# Patient Record
Sex: FEMALE | Race: WHITE | NOT HISPANIC OR LATINO | Employment: STUDENT | URBAN - METROPOLITAN AREA
[De-identification: names, ages, dates, MRNs, and addresses within clinical notes are randomized per-mention and may not be internally consistent; named-entity substitution may affect disease eponyms.]

---

## 2017-03-02 ENCOUNTER — DOCUMENTATION ONLY (OUTPATIENT)
Dept: ADMINISTRATIVE | Facility: HOSPITAL | Age: 27
End: 2017-03-02

## 2017-03-02 NOTE — PROGRESS NOTES
PRE-VISIT CHART REVIEW    Appointment Scheduled on 3/3/17    Department stratifications & guidelines reviewed:yes    Target Chronic Diagnosis: None    Chronic Diagnosis Intervention Due: no    Goals Updated:n/a    Health Maintenance Due   Topic Date Due    Lipid Panel  1990    Pap Smear  04/10/2016    Influenza Vaccine  08/01/2016       Advanced Directives:   65 years of age or older?  No  Directive on file?  N\A                                      Pre-visit patient communication:  In Person    Studies or screenings scheduled pre-visit: no

## 2017-03-03 ENCOUNTER — OFFICE VISIT (OUTPATIENT)
Dept: FAMILY MEDICINE | Facility: CLINIC | Age: 27
End: 2017-03-03
Payer: COMMERCIAL

## 2017-03-03 ENCOUNTER — PATIENT MESSAGE (OUTPATIENT)
Dept: FAMILY MEDICINE | Facility: CLINIC | Age: 27
End: 2017-03-03

## 2017-03-03 VITALS
SYSTOLIC BLOOD PRESSURE: 110 MMHG | WEIGHT: 134.06 LBS | OXYGEN SATURATION: 99 % | DIASTOLIC BLOOD PRESSURE: 60 MMHG | HEART RATE: 81 BPM | HEIGHT: 63 IN | RESPIRATION RATE: 14 BRPM | TEMPERATURE: 98 F | BODY MASS INDEX: 23.75 KG/M2

## 2017-03-03 DIAGNOSIS — M25.532 LEFT WRIST PAIN: Primary | ICD-10-CM

## 2017-03-03 DIAGNOSIS — M77.8 TENDONITIS OF WRIST, LEFT: ICD-10-CM

## 2017-03-03 PROCEDURE — 1160F RVW MEDS BY RX/DR IN RCRD: CPT | Mod: S$GLB,,, | Performed by: NURSE PRACTITIONER

## 2017-03-03 PROCEDURE — 99214 OFFICE O/P EST MOD 30 MIN: CPT | Mod: S$GLB,,, | Performed by: NURSE PRACTITIONER

## 2017-03-03 NOTE — MR AVS SNAPSHOT
"    [facility]  [address] [address]  [place] [address]  Phone: [phone]  Fax: [phone]                  Linda Sherwood   3/3/2017 2:00 PM   Office Visit    Description:  Female : 1990   Provider:  Yenni Coleman NP   Department:  [facility]           Reason for Visit     Wrist Pain           Diagnoses this Visit        Comments    Left wrist pain    -  Primary            To Do List           Future Appointments        Provider Department Dept Phone    3/13/2017 12:15 PM [id number] [phone]    3/13/2017 1:00 PM Davy Yu MD [facility] [phone]      Goals (5 Years of Data)     None      [facility]     [facility] Care Line -  Assistance  Registered nurses in the [facility] provide clinical advisement, health education, appointment booking, and other advisory services.  Call for this free service at [phone].             Medications           Message regarding Medications     Verify the changes and/or additions to your medication regime listed below are the same as discussed with your clinician today.  If any of these changes or additions are incorrect, please notify your healthcare provider.             Verify that the below list of medications is an accurate representation of the medications you are currently taking.  If none reported, the list may be blank. If incorrect, please contact your healthcare provider. Carry this list with you in case of emergency.                Clinical Reference Information           Your Vitals Were     BP Pulse Temp Resp Height Weight    110/60 (BP Location: Left arm, Patient Position: Sitting, BP Method: Manual) 81 98.3 °F (36.8 °C) (Oral) 14 5' 3" (1.6 m) 60.8 kg (134 lb 0.6 oz)    SpO2 BMI             99% 23.74 kg/m2         Blood Pressure          Most Recent Value    BP  110/60      Allergies as of " 3/3/2017     No Known Allergies      Immunizations Administered on Date of Encounter - 3/3/2017     None      Orders Placed During Today's Visit      Normal Orders This Visit    Ambulatory referral to Orthopedics     Future Labs/Procedures Expected by Expires    X-Ray Wrist Complete Left  3/3/2017 3/3/2018      Language Assistance Services     ATTENTION: Language assistance services are available, free of charge. Please call [phone].      ATENCIÓN: Si habla jaradañol, tiene a cherry disposición servicios gratuitos de asistencia lingüística. Llame al [phone].     CHÚ Ý: N?u b?n nói Ti?ng Vi?t, có các d?ch v? h? tr? ngôn ng? mi?n phí dành cho b?n. G?i s? [phone].         [facility] complies with applicable Federal civil rights laws and does not discriminate on the basis of race, color, national origin, age, disability, or sex.

## 2017-03-03 NOTE — PROGRESS NOTES
"Subjective:       Patient ID: Linda Sherwood is a 26 y.o. female.    Chief Complaint: Wrist Pain (left wrist, symptoms for 1-2 weeks)    HPI left wrist pain for several weeks. Any type of pressure hurts.  so hurts to use it at work. Pain from the wrist into the thumb area. No numbness. Weak at times. Cannot put weight on it. Motrin does not help. No specific injury. See ROS.    The following portion of the patients history was reviewed and updated as appropriate: allergies, current medications, past medical and surgical history. Past social history and problem list reviewed. Family PMH and Past social history reviewed. Tobacco, Illicit drug use reviewed.     Review of Systems  Constitutional: No fatigue or fever    Skin: no rashes or lesions  Respiratory:   No SOB, Wheezing, cough  Cardiovascular:   No CP, Palpitations  Musculoskeletal:   left wrist joint pain  No change in gait or coordination. .See HPI  Neurological:   No dizziness. No headaches    Objective:     /60 (BP Location: Left arm, Patient Position: Sitting, BP Method: Manual)  Pulse 81  Temp 98.3 °F (36.8 °C) (Oral)   Resp 14  Ht 5' 3" (1.6 m)  Wt 60.8 kg (134 lb 0.6 oz)  SpO2 99%  BMI 23.74 kg/m2     Physical Exam     Constitutional: oriented to person, place, and time. well-developed and well-nourished.   Cardiovascular: Normal rate, regular rhythm and normal heart sounds.    Pulmonary/Chest: Effort normal and breath sounds normal. No respiratory distress. No wheezes.   Musculoskeletal: Normal range of motion.  equal bilaterally. Tenderness with palpation to left wrist area. Pain with flexion and extension from the wrist.   Neurological: oriented to person, place, and time.   Skin: Skin is warm and dry. No rashes or lesions    Assessment:       1. Left wrist pain    2. Tendonitis of wrist, left        Plan:         Linda was seen today for wrist pain.    Diagnoses and all orders for this visit:    Left wrist pain: refer " to Orthopedics for evaluation and treatment options. Get Xray before appointment with Ortho.   -     X-Ray Wrist Complete Left; Future  -     Ambulatory referral to Orthopedics    Tendonitis of wrist, left: 600mg motrin BID.   Gave written script for cockup splint to wear when sleeping and for heavy lifting, repetitive motion activities.       Healthy diet, exercise  Adequate rest  Adequate hydration  Avoid allergens  Avoid excessive caffeine

## 2017-03-08 ENCOUNTER — HOSPITAL ENCOUNTER (OUTPATIENT)
Dept: RADIOLOGY | Facility: HOSPITAL | Age: 27
Discharge: HOME | End: 2017-03-08
Attending: NURSE PRACTITIONER
Payer: COMMERCIAL

## 2017-03-08 ENCOUNTER — PATIENT MESSAGE (OUTPATIENT)
Dept: FAMILY MEDICINE | Facility: CLINIC | Age: 27
End: 2017-03-08

## 2017-03-08 DIAGNOSIS — M25.532 LEFT WRIST PAIN: ICD-10-CM

## 2017-03-08 PROCEDURE — 73110 X-RAY EXAM OF WRIST: CPT | Mod: TC,PO,LT

## 2017-03-08 PROCEDURE — 73110 X-RAY EXAM OF WRIST: CPT | Mod: 26,LT,, | Performed by: RADIOLOGY

## 2017-03-09 ENCOUNTER — OFFICE VISIT (OUTPATIENT)
Dept: ORTHOPEDICS | Facility: CLINIC | Age: 27
End: 2017-03-09
Payer: COMMERCIAL

## 2017-03-09 VITALS — HEIGHT: 63 IN | WEIGHT: 134 LBS | BODY MASS INDEX: 23.74 KG/M2

## 2017-03-09 DIAGNOSIS — M77.8 LEFT WRIST TENDINITIS: Primary | ICD-10-CM

## 2017-03-09 PROCEDURE — 99999 PR PBB SHADOW E&M-EST. PATIENT-LVL II: CPT | Mod: PBBFAC,,, | Performed by: ORTHOPAEDIC SURGERY

## 2017-03-09 PROCEDURE — 99243 OFF/OP CNSLTJ NEW/EST LOW 30: CPT | Mod: S$GLB,,, | Performed by: ORTHOPAEDIC SURGERY

## 2017-03-09 NOTE — LETTER
March 9, 2017      Yenni Coleman, TREVIN  [address] [address] [address]           [facility] [address] [facility] [address] [place] [address]  Phone: [phone]          Patient: Linda Sherwood   MR Number: [id number]   YOB: 1990   Date of Visit: 3/9/2017       Dear Yenni Coleman:    Thank you for referring Linda Sherwood to me for evaluation. Attached you will find relevant portions of my assessment and plan of care.    If you have questions, please do not hesitate to call me. I look forward to following Linda Sherwood along with you.    Sincerely,    Davy Yu MD    Enclosure  CC:  No Recipients    If you would like to receive this communication electronically, please contact [email or web address] or [phone] to request more information on [employer] Link access.    For providers and/or their staff who would like to refer a patient to [facility], please contact us through our one-stop-shop provider referral line, Jimmy Malin, at [phone].    If you feel you have received this communication in error or would no longer like to receive these types of communications, please e-mail [email or web address]

## 2017-03-09 NOTE — PROGRESS NOTES
DATE: 3/9/2017  PATIENT: Linda Sherwood  REFERRING MD: Yenni Coleman NP  CHIEF COMPLAINT:   Chief Complaint   Patient presents with    Left Wrist - Pain       HISTORY:  Linda Sherwood is a 26 y.o. right hand dominant female  who is referred by Yenni Coleman NP for initial evaluation of left wrist pain.  She is employed as a  and notes over the last 2 weeks she developed discomfort on the dorsum of her wrist.  She denies any traumatic event.  She does a lot of repetitive activities and she states her biggest problem is blow drying tear.  She did get a wrist brace which does help.  She denies any numbness, tingling or weakness to the upper extremity.  She notes pain with activity.  She denies any previous injuries to her left wrist.  Pain is reported at 3/10 today.    PAST MEDICAL/SURGICAL HISTORY:  Past Medical History:   Diagnosis Date    Anxiety     Depression      Past Surgical History:   Procedure Laterality Date    INTESTINAL MALROTATION REPAIR         Current Medications: No current outpatient prescriptions on file.    Social History:   Social History     Social History    Marital status:      Spouse name: N/A    Number of children: N/A    Years of education: N/A     Occupational History     [employer]     Social History Main Topics    Smoking status: Former Smoker    Smokeless tobacco: Never Used    Alcohol use No    Drug use: No    Sexual activity: Not on file     Other Topics Concern    Not on file     Social History Narrative       ROS:  Constitution: Negative for chills, fever, and sweats. Negative for unexplained weight loss.  HENT: Negative for headaches and blurry vision.   Cardiovascular: Negative for chest pain, irregular heartbeat, leg swelling and palpitations.   Respiratory: Negative for cough and shortness of breath.   Gastrointestinal: Negative for abdominal pain, heartburn, nausea and vomiting.   Genitourinary:  "Negative for bladder incontinence and dysuria.   Musculoskeletal: Negative for systemic arthritis, joint swelling, muscle weakness and myalgias.   Neurological: Negative for numbness.   Psychiatric/Behavioral: Negative for depression.   Endocrine: Negative for polyuria.   Hematologic/Lymphatic: Negative for bleeding disorders.  Skin: Negative for poor wound healing.       PHYSICAL EXAM:  Ht 5' 3" (1.6 m)  Wt 60.8 kg (134 lb)  BMI 23.74 kg/m2  Healthy-appearing female no acute distress.  Examination left wrist reveals a brace in place this is been removed.  No ecchymosis, swelling or effusion.  There is tenderness over the base of the second metacarpal dorsally.  No tenderness in the snuffbox or the first dorsal extensor compartment.  Range of motion wrist is full to dorsiflexion, volar flexion, radial and ulnar deviation.   strength within normal limits.  Sensation intact to the radial, ulnar, median nerve distribution of the hand.      IMAGING:   X-rays a left wrist are performed and reviewed.  No acute fractures, dislocations, bony or soft tissue abnormalities are noted.     ASSESSMENT:   Extensor tendinitis left wrist    PLAN:  The nature of the diagnosis, using models and diagrams when appropriate, was explained to the patient in detail.. Al I have explained E the treatment is continuing with the wrist splint, anti-inflammatory medication but most importantly avoidance of the offending activities.  I recommended that he refrain from blow drying care for the next 1-2 weeks that her symptoms would improve dramatically.  We discussed possibility of cortisone injection or occupational therapy should she continue to remain symptomatic.  She'll monitor her symptoms over the next few weeks.  Should they persist, she'll return for reexamination and further treatment recommendations.      This note was dictated using voice recognition software and may contain grammatical errors  Answers for HPI/ROS submitted by the " patient on 3/7/2017   Hand injury  unexpected weight change: No  appetite change : No  sleep disturbance: No  IMMUNOCOMPROMISED: No  nervous/ anxious: No  dysphoric mood: No  rash: No  visual disturbance: No  eye redness: No  eye pain: No  ear pain: No  tinnitus: No  hearing loss: No  sinus pressure : No  nosebleeds: No  enviro allergies: Yes  food allergies: No  cough: No  shortness of breath: No  sweating: No  dysuria: No  frequency: No  difficulty urinating: No  hematuria: No  painful intercourse: No  chest pain: No  palpitations: No  nausea: No  vomiting: No  diarrhea: No  blood in stool: No  constipation: No  headaches: No  dizziness: No  numbness: No  seizures: No  joint swelling: No  myalgia: No  weakness: No  back pain: No  Pain Chronicity: chronic  History of trauma: No  Onset: 1 to 4 weeks ago  Frequency: constantly  Progression since onset: gradually worsening  Injury mechanism: pushing  injury location: at home  pain- numeric: 4/10  pain location: left wrist  pain quality: aching, sharp, tightness, tight  Radiating Pain: No  Aggravating factors: bearing weight, extension, rotation  fever: No  inability to bear weight: Yes  itching: No  joint locking: No  limited range of motion: Yes  stiffness: No  tingling: No  Treatments tried: acupuncture, brace/corset, NSAIDs, rest  physical therapy: not tried  Improvement on treatment: no relief

## 2017-10-11 ENCOUNTER — PATIENT MESSAGE (OUTPATIENT)
Dept: FAMILY MEDICINE | Facility: CLINIC | Age: 27
End: 2017-10-11

## 2017-10-12 ENCOUNTER — TELEPHONE (OUTPATIENT)
Dept: ADMINISTRATIVE | Facility: HOSPITAL | Age: 27
End: 2017-10-12

## 2017-10-12 NOTE — LETTER
October 16, 2017    Dr. Marta Mcgee             [facility]  [address]  [facility] [address]  Phone: [phone] October 16, 2017     Patient: Linda Sherwood    YOB: 1990   Date of Visit: 10/12/2017       To Whom It May Concern:    We are seeing Linda Sherwood in the clinic at [facility].  Yenni Coelman NP is their PCP.  She has an outstanding lab/procedure at the time we reviewed their chart.  To help with our Health Maintenance records will you please supply the following report(s):      []  Mammogram                                                []  Colonoscopy   [x]  Pap Smear (done 10/6/17)                       []  Outside Lab Results   []  Dexa scans                                                   []  Eye Exam   []  Foot Exam                                                     [] Other___________   []  Outside Immunizations                                               Please Fax to [facility] at [phone].    Thank you for your help. If my Care Coordinator can be of any assistance, you can call RIO Dugan at [phone].    This is the second attempt to acquire these records.    If you have any questions or concerns, please don't hesitate to call.    Sincerely,        Yenni Coleman NP

## 2017-10-12 NOTE — LETTER
October 12, 2017    Dr. Marta Mcgee             [facility]  [address]  [facility] [address]  Phone: [phone] October 12, 2017     Patient: Linda Sherwood    YOB: 1990   Date of Visit: 10/12/2017       To Whom It May Concern:                                             We are seeing Linda Sherwood in the clinic at [facility].  Yenni Coleman NP is their PCP.  She has an outstanding lab/procedure at the time we reviewed their chart.  To help with our Health Maintenance records will you please supply the following report(s):      []  Mammogram                                                []  Colonoscopy   [x]  Pap Smear (done 10/6/17)                       []  Outside Lab Results   []  Dexa scans                                                   []  Eye Exam   []  Foot Exam                                                     [] Other___________   []  Outside Immunizations                                               Please Fax to [facility] at [phone].    Thank you for your help. If my Care Coordinator can be of any assistance, you can call RIO Dugan at [phone].    If you have any questions or concerns, please don't hesitate to call.    Sincerely,        Yenni Coleman NP

## 2017-10-12 NOTE — TELEPHONE ENCOUNTER
Please update my record with the following information.  Test or Procedure: PAP smear  Date Completed: 10/6/2017  Place of Service: Dr Mcgee   Include any additional comments: results were normal    You may attach a copy of the test or result below.

## 2018-03-07 ENCOUNTER — OFFICE VISIT (OUTPATIENT)
Dept: FAMILY MEDICINE | Facility: CLINIC | Age: 28
End: 2018-03-07
Payer: COMMERCIAL

## 2018-03-07 VITALS
BODY MASS INDEX: 22.5 KG/M2 | SYSTOLIC BLOOD PRESSURE: 112 MMHG | DIASTOLIC BLOOD PRESSURE: 60 MMHG | RESPIRATION RATE: 16 BRPM | TEMPERATURE: 99 F | HEIGHT: 63 IN | WEIGHT: 127 LBS | HEART RATE: 80 BPM

## 2018-03-07 DIAGNOSIS — M25.552 BILATERAL HIP PAIN: ICD-10-CM

## 2018-03-07 DIAGNOSIS — M25.551 BILATERAL HIP PAIN: ICD-10-CM

## 2018-03-07 DIAGNOSIS — Z00.00 LABORATORY EXAM ORDERED AS PART OF ROUTINE GENERAL MEDICAL EXAMINATION: ICD-10-CM

## 2018-03-07 DIAGNOSIS — Z00.00 ANNUAL PHYSICAL EXAM: Primary | ICD-10-CM

## 2018-03-07 PROCEDURE — 99395 PREV VISIT EST AGE 18-39: CPT | Mod: S$GLB,,, | Performed by: NURSE PRACTITIONER

## 2018-03-07 RX ORDER — DEXTROAMPHETAMINE SULFATE, DEXTROAMPHETAMINE SACCHARATE, AMPHETAMINE SULFATE AND AMPHETAMINE ASPARTATE 5; 5; 5; 5 MG/1; MG/1; MG/1; MG/1
1 CAPSULE, EXTENDED RELEASE ORAL DAILY
Refills: 0 | COMMUNITY
Start: 2018-03-06 | End: 2022-09-13

## 2018-03-07 RX ORDER — NORELGESTROMIN AND ETHINYL ESTRADIOL 150; 35 UG/D; UG/D
1 PATCH TRANSDERMAL WEEKLY
Refills: 12 | COMMUNITY
Start: 2018-02-15 | End: 2018-04-12

## 2018-03-07 NOTE — PROGRESS NOTES
"Subjective:       Patient ID: Linda Sherwood is a 27 y.o. female.    Chief Complaint: Annual Exam    HPI Here for annual exam. States she is doing well. She is due for routine labs. States she is current on her pap. She states that she has some bilateral hip pain that comes and goes. Has had this discomfort for over 6 months now. States at night she cannot lay on her right or left hip because they will start to hurt, achy type pain. If she walks long distances or stands on her feet all day then they will start to hurt. She would like to have this evaluated. She denies any trauma to the area. She denies any other concerns. See ROS.    The following portion of the patients history was reviewed and updated as appropriate: allergies, current medications, past medical and surgical history. Past social history and problem list reviewed. Family PMH and Past social history reviewed. Tobacco, Illicit drug use reviewed.     Review of Systems  Constitutional: No fatigue or fever    HENT: no sore throat or nasal congestion. No voice changes    Eyes: No vision changes, blurred vision  Skin: no rashes or lesions  Respiratory:   No SOB, Wheezing, cough  Cardiovascular:   No CP, Palpitations  Gastrointestinal:   No N/V/D. No abdominal pain, weight stable. Appetite good.   Genitourinary:   No dysuria, urgency or frequency. No change in bowels. No blood in stools.   Musculoskeletal:     No change in gait or coordination. See HPI  Neurological:   No dizziness. No headaches  Hematological: No abnormal bruising or bleeding    Psychiatric/Behavioral Negative for suicidal ideas.  Denies feelings of depression. No thoughts of wanting to harm self or others.     Objective:     /60 (BP Location: Right leg, Patient Position: Sitting, BP Method: Medium (Manual))   Pulse 80   Temp 98.9 °F (37.2 °C) (Oral)   Resp 16   Ht 5' 3" (1.6 m)   Wt 57.6 kg (126 lb 15.8 oz)   LMP 02/07/2018 (Approximate)   BMI 22.49 kg/m²      Physical " Exam    Constitutional: oriented to person, place, and time. well-developed and well-nourished.   HENT: normal  Head: Normocephalic.   Eyes: Conjunctivae are normal. Pupils are equal, round, and reactive to light.   Neck: Normal range of motion. Neck supple. No tracheal deviation present. No thyromegaly present.   Cardiovascular: Normal rate, regular rhythm and normal heart sounds.    Pulmonary/Chest: Effort normal and breath sounds normal. No respiratory distress. No wheezes.   Abdominal: Soft. Bowel sounds are normal. No distension. There is no tenderness.   Musculoskeletal: Normal range of motion. Normal lower extremity strength. Pulses normal.   Neurological: oriented to person, place, and time.   Skin: Skin is warm and dry. No rashes or lesions  Psychiatric: Normal mood and affect.Behavior is normal. Judgment and thought content normal.   Assessment:       1. Annual physical exam    2. Laboratory exam ordered as part of routine general medical examination    3. Bilateral hip pain        Plan:         Linda was seen today for annual exam.    Diagnoses and all orders for this visit:    Annual physical exam    Laboratory exam ordered as part of routine general medical examination  -     CBC auto differential; Future  -     Comprehensive metabolic panel; Future  -     Lipid panel; Future  -     TSH; Future    Bilateral hip pain: will get xrays and if needed refer to physical therapy.   -     X-Ray Hip 3 or 4 views Bilateral; Future    Continue current medication  Take medications only as prescribed  Healthy diet, exercise  Adequate rest  Adequate hydration  Avoid allergens  Avoid excessive caffeine

## 2018-03-09 ENCOUNTER — PATIENT MESSAGE (OUTPATIENT)
Dept: FAMILY MEDICINE | Facility: CLINIC | Age: 28
End: 2018-03-09

## 2018-03-12 ENCOUNTER — LAB VISIT (OUTPATIENT)
Dept: LAB | Facility: HOSPITAL | Age: 28
End: 2018-03-12
Attending: NURSE PRACTITIONER
Payer: COMMERCIAL

## 2018-03-12 DIAGNOSIS — Z00.00 LABORATORY EXAM ORDERED AS PART OF ROUTINE GENERAL MEDICAL EXAMINATION: ICD-10-CM

## 2018-03-12 LAB
ALBUMIN SERPL BCP-MCNC: 4.1 G/DL
ALP SERPL-CCNC: 64 U/L
ALT SERPL W/O P-5'-P-CCNC: 13 U/L
ANION GAP SERPL CALC-SCNC: 9 MMOL/L
AST SERPL-CCNC: 16 U/L
BASOPHILS # BLD AUTO: 0.06 K/UL
BASOPHILS NFR BLD: 0.9 %
BILIRUB SERPL-MCNC: 0.5 MG/DL
BUN SERPL-MCNC: 12 MG/DL
CALCIUM SERPL-MCNC: 9.5 MG/DL
CHLORIDE SERPL-SCNC: 105 MMOL/L
CHOLEST SERPL-MCNC: 215 MG/DL
CHOLEST/HDLC SERPL: 3.1 {RATIO}
CO2 SERPL-SCNC: 23 MMOL/L
CREAT SERPL-MCNC: 0.8 MG/DL
DIFFERENTIAL METHOD: ABNORMAL
EOSINOPHIL # BLD AUTO: 0.2 K/UL
EOSINOPHIL NFR BLD: 2.8 %
ERYTHROCYTE [DISTWIDTH] IN BLOOD BY AUTOMATED COUNT: 12.2 %
EST. GFR  (AFRICAN AMERICAN): >60 ML/MIN/1.73 M^2
EST. GFR  (NON AFRICAN AMERICAN): >60 ML/MIN/1.73 M^2
GLUCOSE SERPL-MCNC: 90 MG/DL
HCT VFR BLD AUTO: 36.3 %
HDLC SERPL-MCNC: 70 MG/DL
HDLC SERPL: 32.6 %
HGB BLD-MCNC: 12.5 G/DL
IMM GRANULOCYTES # BLD AUTO: 0.01 K/UL
IMM GRANULOCYTES NFR BLD AUTO: 0.2 %
LDLC SERPL CALC-MCNC: 120 MG/DL
LYMPHOCYTES # BLD AUTO: 1.7 K/UL
LYMPHOCYTES NFR BLD: 26.2 %
MCH RBC QN AUTO: 29.6 PG
MCHC RBC AUTO-ENTMCNC: 34.4 G/DL
MCV RBC AUTO: 86 FL
MONOCYTES # BLD AUTO: 0.3 K/UL
MONOCYTES NFR BLD: 4.6 %
NEUTROPHILS # BLD AUTO: 4.3 K/UL
NEUTROPHILS NFR BLD: 65.3 %
NONHDLC SERPL-MCNC: 145 MG/DL
NRBC BLD-RTO: 0 /100 WBC
PLATELET # BLD AUTO: 226 K/UL
PMV BLD AUTO: 11.5 FL
POTASSIUM SERPL-SCNC: 4.6 MMOL/L
PROT SERPL-MCNC: 7.4 G/DL
RBC # BLD AUTO: 4.22 M/UL
SODIUM SERPL-SCNC: 137 MMOL/L
TRIGL SERPL-MCNC: 125 MG/DL
TSH SERPL DL<=0.005 MIU/L-ACNC: 1.43 UIU/ML
WBC # BLD AUTO: 6.52 K/UL

## 2018-03-12 PROCEDURE — 36415 COLL VENOUS BLD VENIPUNCTURE: CPT | Mod: PO

## 2018-03-12 PROCEDURE — 80061 LIPID PANEL: CPT

## 2018-03-12 PROCEDURE — 84443 ASSAY THYROID STIM HORMONE: CPT

## 2018-03-12 PROCEDURE — 80053 COMPREHEN METABOLIC PANEL: CPT

## 2018-03-12 PROCEDURE — 85025 COMPLETE CBC W/AUTO DIFF WBC: CPT

## 2018-03-13 ENCOUNTER — PATIENT MESSAGE (OUTPATIENT)
Dept: FAMILY MEDICINE | Facility: CLINIC | Age: 28
End: 2018-03-13

## 2018-03-14 ENCOUNTER — PATIENT MESSAGE (OUTPATIENT)
Dept: FAMILY MEDICINE | Facility: CLINIC | Age: 28
End: 2018-03-14

## 2018-03-14 ENCOUNTER — HOSPITAL ENCOUNTER (OUTPATIENT)
Dept: RADIOLOGY | Facility: HOSPITAL | Age: 28
Discharge: HOME | End: 2018-03-14
Attending: NURSE PRACTITIONER
Payer: COMMERCIAL

## 2018-03-14 DIAGNOSIS — M25.559 ARTHRALGIA OF HIP, UNSPECIFIED LATERALITY: Primary | ICD-10-CM

## 2018-03-14 DIAGNOSIS — M25.551 BILATERAL HIP PAIN: ICD-10-CM

## 2018-03-14 DIAGNOSIS — M25.552 BILATERAL HIP PAIN: ICD-10-CM

## 2018-03-14 PROCEDURE — 73521 X-RAY EXAM HIPS BI 2 VIEWS: CPT | Mod: 26,,, | Performed by: RADIOLOGY

## 2018-03-14 PROCEDURE — 73521 X-RAY EXAM HIPS BI 2 VIEWS: CPT | Mod: TC,FY,PO

## 2018-03-14 NOTE — TELEPHONE ENCOUNTER
----- Message from Norma Connell sent at 3/14/2018  2:51 PM CDT -----  Contact: patient   Patient returning a missed call about results. Please advise.  Call to pod. No answer.   Call back    Thanks!

## 2018-03-14 NOTE — TELEPHONE ENCOUNTER
xrays show some degenerative changes to the sacroiliac joint which can cause some hip pain. This can cause some arthritis type pain. I can refer her to physical medicine for injection if she would like.

## 2018-03-22 ENCOUNTER — LAB VISIT (OUTPATIENT)
Dept: LAB | Facility: HOSPITAL | Age: 28
End: 2018-03-22
Payer: COMMERCIAL

## 2018-03-22 ENCOUNTER — OFFICE VISIT (OUTPATIENT)
Dept: RHEUMATOLOGY | Facility: CLINIC | Age: 28
End: 2018-03-22
Payer: COMMERCIAL

## 2018-03-22 VITALS
DIASTOLIC BLOOD PRESSURE: 74 MMHG | WEIGHT: 126 LBS | HEART RATE: 84 BPM | HEIGHT: 64 IN | BODY MASS INDEX: 21.51 KG/M2 | SYSTOLIC BLOOD PRESSURE: 111 MMHG

## 2018-03-22 DIAGNOSIS — M54.50 CHRONIC BILATERAL LOW BACK PAIN WITHOUT SCIATICA: ICD-10-CM

## 2018-03-22 DIAGNOSIS — M54.2 NECK PAIN: ICD-10-CM

## 2018-03-22 DIAGNOSIS — G89.29 CHRONIC BILATERAL LOW BACK PAIN WITHOUT SCIATICA: ICD-10-CM

## 2018-03-22 LAB
BACTERIA #/AREA URNS AUTO: NORMAL /HPF
BILIRUB UR QL STRIP: NEGATIVE
CLARITY UR REFRACT.AUTO: ABNORMAL
COLOR UR AUTO: YELLOW
GLUCOSE UR QL STRIP: NEGATIVE
HGB UR QL STRIP: ABNORMAL
KETONES UR QL STRIP: NEGATIVE
LEUKOCYTE ESTERASE UR QL STRIP: ABNORMAL
MICROSCOPIC COMMENT: NORMAL
NITRITE UR QL STRIP: NEGATIVE
PH UR STRIP: 7 [PH] (ref 5–8)
PROT UR QL STRIP: NEGATIVE
RBC #/AREA URNS AUTO: 2 /HPF (ref 0–4)
SP GR UR STRIP: 1.01 (ref 1–1.03)
SQUAMOUS #/AREA URNS AUTO: 8 /HPF
URN SPEC COLLECT METH UR: ABNORMAL
UROBILINOGEN UR STRIP-ACNC: NEGATIVE EU/DL
WBC #/AREA URNS AUTO: 5 /HPF (ref 0–5)

## 2018-03-22 PROCEDURE — 99999 PR PBB SHADOW E&M-EST. PATIENT-LVL IV: CPT | Mod: PBBFAC,,, | Performed by: INTERNAL MEDICINE

## 2018-03-22 PROCEDURE — 99204 OFFICE O/P NEW MOD 45 MIN: CPT | Mod: S$GLB,,, | Performed by: INTERNAL MEDICINE

## 2018-03-22 PROCEDURE — 86480 TB TEST CELL IMMUN MEASURE: CPT

## 2018-03-22 PROCEDURE — 81001 URINALYSIS AUTO W/SCOPE: CPT

## 2018-03-22 RX ORDER — NAPROXEN 500 MG/1
500 TABLET ORAL 2 TIMES DAILY
Qty: 60 TABLET | Refills: 3 | Status: SHIPPED | OUTPATIENT
Start: 2018-03-22 | End: 2022-09-13

## 2018-03-22 RX ORDER — NAPROXEN 500 MG/1
500 TABLET ORAL 2 TIMES DAILY
Qty: 60 TABLET | Refills: 3 | Status: SHIPPED | OUTPATIENT
Start: 2018-03-22 | End: 2018-03-22 | Stop reason: SDUPTHER

## 2018-03-22 ASSESSMENT — ROUTINE ASSESSMENT OF PATIENT INDEX DATA (RAPID3)
AM STIFFNESS SCORE: 1, YES
MDHAQ FUNCTION SCORE: .2
PATIENT GLOBAL ASSESSMENT SCORE: 4
PAIN SCORE: 6
TOTAL RAPID3 SCORE: 3.56
PSYCHOLOGICAL DISTRESS SCORE: 2.2
WHEN YOU AWAKENED IN THE MORNING OVER THE LAST WEEK, PLEASE INDICATE THE AMOUNT OF TIME IT TAKES UNTIL YOU ARE AS LIMBER AS YOU WILL BE FOR THE DAY: 2 HRS
FATIGUE SCORE: 4

## 2018-03-22 NOTE — PROGRESS NOTES
"Subjective:       Patient ID: Linda Sherwood is a 27 y.o. female.    Chief Complaint: Low-back Pain    HPI     This is a 26 yo F with history of ADHD, depression/anxiety who presents with pain in bilateral hips and lower back x 4-5 years and pain and stiffness in neck x 1 year. In terms of the hip pain, patient reports that she has significant pain in the groin area on both sides. Also with pain in the lower back. The pain is worse when she is in one position for long periods of time. Walking becomes uncomfortable after 1 hour and she feels like she has to "drag her feet" to walk. Also is not able to sleep well at night due to pain. Wakes up in the morning with stiffness which can last an hour. Had pelvic xrays done by PCP and was told that she has some "degenerative changes" in the joints. For the past two weeks has been taking either Naproxen or Advil daily. Usually either takes one Naproxen or two Advil. Both help the pain. In terms of the neck pain, patient reports that it is worse at night and is usually associated with headaches at the back of the head. Applies biofreeze or heating pads and both help.  Has bilateral heel pain but only when wearing flat shoes. Has bilateral wrist pain associated with some numbness and tingling in the fingers. Uses bilateral wrists splints. No joint swelling, enlarged/swollen fingers, fevers, nausea, vomiting, eye redness, pleurisy, oral/nasal ulcers, skin rashes, hx of psoriasis, sinus problems, weakness, Raynaud's, abdominal pain, nail pitting, weight changes, diarrhea, hx of IBD, urinary symptoms, muscle pain, hair loss. Had one miscarriage in the first trimester but then had two normal deliveries. No family history of autoimmune disease or spondyloarthopathy. Works as a  and is on her feet for 5-6 hours a day. No smoking or alcohol.    Past Medical History:   Diagnosis Date    Anxiety     Attention deficit disorder with hyperactivity 01/15/2018    " "Depression      Past Surgical History:   Procedure Laterality Date    INTESTINAL MALROTATION REPAIR       Social History   Substance Use Topics    Smoking status: Former Smoker    Smokeless tobacco: Never Used    Alcohol use No     Family History   Problem Relation Age of Onset    Heart disease Paternal Grandfather      Review of patient's allergies indicates:  No Known Allergies      Current Outpatient Prescriptions:     ADDERALL XR 20 mg 24 hr capsule, Take 1 capsule by mouth once daily., Disp: , Rfl: 0    XULANE 150-35 mcg/24 hr, Place 1 patch onto the skin once a week., Disp: , Rfl: 12    naproxen (NAPROSYN) 500 MG tablet, Take 1 tablet (500 mg total) by mouth 2 (two) times daily., Disp: 60 tablet, Rfl: 3      Review of Systems   Constitutional: Negative for chills, fatigue and fever.   HENT: Negative for congestion, mouth sores, sinus pain and trouble swallowing.    Eyes: Negative for photophobia and redness.   Respiratory: Negative for chest tightness and shortness of breath.    Gastrointestinal: Negative for abdominal distention, abdominal pain, blood in stool, diarrhea, nausea and vomiting.   Endocrine: Negative for cold intolerance.   Genitourinary: Negative for difficulty urinating.   Musculoskeletal: Positive for back pain, neck pain and neck stiffness. Negative for arthralgias, joint swelling and myalgias.        Bilateral wrist pain   Neurological: Negative for dizziness, weakness and light-headedness.        Numbness in hands   Psychiatric/Behavioral: Negative for agitation.         Objective:   /74   Pulse 84   Ht 5' 3.6" (1.615 m)   Wt 57.2 kg (126 lb)   BMI 21.90 kg/m²      Physical Exam   Constitutional: She is oriented to person, place, and time and well-developed, well-nourished, and in no distress.   HENT:   Head: Normocephalic and atraumatic.   Eyes: EOM are normal. Pupils are equal, round, and reactive to light.   Neck: Normal range of motion. Neck supple.   Cardiovascular: " Normal rate and regular rhythm.    Pulmonary/Chest: Effort normal and breath sounds normal.   Abdominal: Soft. Bowel sounds are normal.   Neurological: She is alert and oriented to person, place, and time.   Skin: Skin is warm.     Psychiatric: Affect normal.   Musculoskeletal:   Schober test at 13.5 cm indicating restriction of forward lumbar flexion  Tenderness to palpation of SI joints/lower back  Normal extension  Normal lateral flexion  Negative FADIR and FADER  Normal range of motion at neck  Normal chest expansion  Positve phalen's sign bilaterally         Results for POOL LANDEROS (MRN [id number]) as of 3/22/2018 15:48   Ref. Range 3/12/2018 11:58 3/22/2018 12:29   WBC Latest Ref Range: 3.90 - 12.70 K/uL 6.52    RBC Latest Ref Range: 4.00 - 5.40 M/uL 4.22    Hemoglobin Latest Ref Range: 12.0 - 16.0 g/dL 12.5    Hematocrit Latest Ref Range: 37.0 - 48.5 % 36.3 (L)    MCV Latest Ref Range: 82 - 98 fL 86    MCH Latest Ref Range: 27.0 - 31.0 pg 29.6    MCHC Latest Ref Range: 32.0 - 36.0 g/dL 34.4    RDW Latest Ref Range: 11.5 - 14.5 % 12.2    Platelets Latest Ref Range: 150 - 350 K/uL 226    MPV Latest Ref Range: 9.2 - 12.9 fL 11.5    Gran% Latest Ref Range: 38.0 - 73.0 % 65.3    Gran # (ANC) Latest Ref Range: 1.8 - 7.7 K/uL 4.3    Immature Granulocytes Latest Ref Range: 0.0 - 0.5 % 0.2    Immature Grans (Abs) Latest Ref Range: 0.00 - 0.04 K/uL 0.01    Lymph% Latest Ref Range: 18.0 - 48.0 % 26.2    Lymph # Latest Ref Range: 1.0 - 4.8 K/uL 1.7    Mono% Latest Ref Range: 4.0 - 15.0 % 4.6    Mono # Latest Ref Range: 0.3 - 1.0 K/uL 0.3    Eosinophil% Latest Ref Range: 0.0 - 8.0 % 2.8    Eos # Latest Ref Range: 0.0 - 0.5 K/uL 0.2    Basophil% Latest Ref Range: 0.0 - 1.9 % 0.9    Baso # Latest Ref Range: 0.00 - 0.20 K/uL 0.06    nRBC Latest Ref Range: 0 /100 WBC 0    Sed Rate Latest Ref Range: 0 - 20 mm/Hr  7      Results for LETI POOL SANDOVAL (MRN [id number]) as of 3/22/2018 15:48   Ref. Range 3/22/2018 12:29    Sodium Latest Ref Range: 136 - 145 mmol/L 138   Potassium Latest Ref Range: 3.5 - 5.1 mmol/L 3.7   Chloride Latest Ref Range: 95 - 110 mmol/L 105   CO2 Latest Ref Range: 23 - 29 mmol/L 27   Anion Gap Latest Ref Range: 8 - 16 mmol/L 6 (L)   BUN, Bld Latest Ref Range: 6 - 20 mg/dL 11   Creatinine Latest Ref Range: 0.5 - 1.4 mg/dL 0.8   eGFR if non African American Latest Ref Range: >60 mL/min/1.73 m^2 >60.0   eGFR if African American Latest Ref Range: >60 mL/min/1.73 m^2 >60.0   Glucose Latest Ref Range: 70 - 110 mg/dL 92   Calcium Latest Ref Range: 8.7 - 10.5 mg/dL 9.5   Alkaline Phosphatase Latest Ref Range: 55 - 135 U/L 65   Total Protein Latest Ref Range: 6.0 - 8.4 g/dL 7.1   Albumin Latest Ref Range: 3.5 - 5.2 g/dL 4.0   Total Bilirubin Latest Ref Range: 0.1 - 1.0 mg/dL 0.5   AST Latest Ref Range: 10 - 40 U/L 14   ALT Latest Ref Range: 10 - 44 U/L 10   CRP Latest Ref Range: 0.0 - 8.2 mg/L 3.8   Results for POOL LANDEROS (MRN [id number]) as of 3/22/2018 15:48   Ref. Range 3/22/2018 12:29   Hep B Core Total Ab Unknown Negative   Hep B S Ab Unknown Positive (A)   Hepatitis B Surface Ag Unknown Negative   Hepatitis C Ab Unknown Negative   HIV 1/2 Ag/Ab Latest Ref Range: Negative  Negative     Xray hips bilateral 3/14/18:  Narrative     EXAMINATION:  XR HIPS BILATERAL 2 VIEW INCL AP PELVIS    CLINICAL HISTORY:  bilateral hip pain;  Pain in right hip    TECHNIQUE:  A single AP view of the bony pelvis and hips and frog leg views of the left and right hip were acquired.    COMPARISON:  None.    FINDINGS:  No acute fracture, dislocation, or osseous destructive process.  There is degenerative change of the sacroiliac joints, left greater than right.  No osteonecrosis of the femoral heads.  No hip joint space narrowing.  No osteitis pubis.  The soft tissue structures of the abdomen and pelvis are unremarkable.   Impression       As above       Assessment:       1. Chronic bilateral low back pain without sciatica    2.  Neck pain        This is a 28 yo F with history of ADHD, depression/anxiety who presents with pain in bilateral hips and lower back x 4-5 years and pain and stiffness in neck x 1 year. She has symptoms of an inflammatory back pain such as no improvement with rest, pain at night, insidious onset, and age <40 years. Her hip xrays show pseudo-widening of joint space; appears to be grade 2 sacroiliitis. Exam positive for limited forward flexion at the lumbar spine as well as significant tenderness to palpation over the SI joints. Patient also with symptoms of enthesitis although no pain on physical exam today. Has had a good response to NSAIDS. No arthritis, uveitis, dactylitis, psoriasis, IBD, family history of SpA. CRP normal. 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 Will check MRI w and w/o contrast of the SI joints and HLA B27 along with pre-dmard labs and further autoimmune work-up as listed below. Recommend continuing NSAIDS at this time along with starting physical therapy. Continue wrist splints for likely b/l carpel tunnel.     Plan:       - start Naproxen 500 mg BID daily   - check the following labs:  - HLA B27 ANTIGEN; Future  - IMTIAZ; Future  - CK; Future  - Comprehensive metabolic panel; Future  - C-reactive protein; Future  - Cyclic citrul peptide antibody, IgG; Future  - Rheumatoid factor; Future  - Sedimentation rate, manual; Future  - Sjogrens syndrome-A extractable nuclear antibody; Future  - Hepatitis B surface antigen; Future  - Hepatitis B surface antibody; Future  - Hepatitis C antibody; Future  - HIV-1 and HIV-2 antibodies; Future  - Hepatitis B core antibody, total; Future  - RPR; Future  - Urinalysis; Future  - Quantiferon Gold TB; Future  - MRI Sacroiliac Joint W W/O Contrast; Future  - Pregnancy Test Screening, Serum; Future  - Ambulatory Referral to Physical/Occupational Therapy  - X-Ray Cervical Spine 5 View With Flex And Ext; Future    RTC in 1 month

## 2018-03-22 NOTE — PROGRESS NOTES
I have personally taken the history and examined the patient and agree with the fellow's note as stated above with the following exceptions: Inflammatory back pain, grade 1-2 sacroiliac changes. Abnormal Schober's and marked sacral tenderness. Agree with Dr. Jimenes's plans. RONALDO

## 2018-03-23 ENCOUNTER — HOSPITAL ENCOUNTER (OUTPATIENT)
Dept: RADIOLOGY | Facility: HOSPITAL | Age: 28
Discharge: HOME | End: 2018-03-23
Attending: INTERNAL MEDICINE
Payer: COMMERCIAL

## 2018-03-23 DIAGNOSIS — M54.2 NECK PAIN: ICD-10-CM

## 2018-03-23 PROCEDURE — 72052 X-RAY EXAM NECK SPINE 6/>VWS: CPT | Mod: TC,FY,PO

## 2018-03-23 PROCEDURE — 72052 X-RAY EXAM NECK SPINE 6/>VWS: CPT | Mod: 26,,, | Performed by: RADIOLOGY

## 2018-03-26 ENCOUNTER — CLINICAL SUPPORT (OUTPATIENT)
Dept: REHABILITATION | Facility: HOSPITAL | Age: 28
End: 2018-03-26
Payer: COMMERCIAL

## 2018-03-26 DIAGNOSIS — G89.29 CHRONIC BILATERAL LOW BACK PAIN WITHOUT SCIATICA: Primary | ICD-10-CM

## 2018-03-26 DIAGNOSIS — M54.2 NECK PAIN: ICD-10-CM

## 2018-03-26 DIAGNOSIS — M54.50 CHRONIC BILATERAL LOW BACK PAIN WITHOUT SCIATICA: Primary | ICD-10-CM

## 2018-03-26 PROCEDURE — 97161 PT EVAL LOW COMPLEX 20 MIN: CPT | Mod: PN

## 2018-03-26 NOTE — PROGRESS NOTES
OUTPATIENT PHYSICAL THERAPY  PHYSICAL THERAPY EVALUATION    Name: Linda Sherwood  Clinic Number: [id number]    Evaluation Date: 03/26/2018  Visit #: 1  Precautions: standard     Diagnosis:Low Back Pain and Neck Pain   Physician: Marlena Jimenes MD  Treatment Orders: PT Eval and Treat  Past Medical History:   Diagnosis Date    Anxiety     Attention deficit disorder with hyperactivity 01/15/2018    Depression      Current Outpatient Prescriptions   Medication Sig    ADDERALL XR 20 mg 24 hr capsule Take 1 capsule by mouth once daily.    naproxen (NAPROSYN) 500 MG tablet Take 1 tablet (500 mg total) by mouth 2 (two) times daily.    XULANE 150-35 mcg/24 hr Place 1 patch onto the skin once a week.     No current facility-administered medications for this visit.      Review of patient's allergies indicates:  No Known Allergies    History   Prior Therapy/PMH: none   Social History:  with 2 kids, works full time   Previous functional status: ongoing pain in low back and hips   Current functional status: increased pain in hips and low back , with onset of neck pain increased with working  Work: works as     Subjective   History of Present Illness: ongoing back and hip pain increased with walking or sitting, increased tension in neck and shoulders from working as a    Chief complaint: low back and hip pain ongoing, and onset of neck pain   Pain: current 3/10 neck, 4/10 back and hips;  worst 7/10 neck; 8/10 back and hips, best 2/10, Aching and Tight  Radicular symptoms: none  Aggravating factors: working and looking down a lot, walking and sitting are the worst for the back and hips  Easing factors: aleve, massage  Pts goals: decrease pain in her back, hips and neck     Objective   Mental status: alert    Static Postural Assessment: forward head, anterior pelvic tilt,     GAIT: Linda ambulates with no assistive device with independently.     GAIT DEVIATIONS: Linda displays decreased  silvana    Dynamic Movement Screen:  - Cervical Flexion: FN   - Cervical Ext: FN   - Cervical Rot R: DN ~ tight upper traps   - Cervical Rot L: DN ~ tight upper traps   - Apley ER R: NT  - Apley ER L: NT  - Apley IR R: NT  - Apley IR L:  NT  - Trunk Flexion: DN ~ tight lumbar paraspinals   - Trunk Ext: DN Hinge at low back ~ decreased thoracic extension  - Trunk Rot R: FN  - Trunk Rot L: DN ~ decreased pelvic rotation/Hip ER   - SLS R: NT  - SLS L:NT     * FN =Functional Non-painful   * FP = Functional Painful   * DN = Dysfunctional Non-painful   * DP = Dysfunctional Painful        - Overhead Squat Assessment (OHSA):   ~Not performed     ROM:  Lumbar flexion 50% limited ~ tight paraspinals and anterior hip flexors   +tight hip ER bilaterally L > R   + tight gastrocs ~ ankle DF to neutral       Strength:   TA = 1   Glute Max strength = 2 Bar   Glute Med: R = 2/ L = 1   Lower Scap stab = 1       Level of strength:   3 = Normal   2 = Compensation (recruits other muscles)  1= Weak    Special Tests:  + ROMINA Bar anterior hip tightness; Left hip pain with overpressure     Palpation:  L anterior pelvic rotation ~ corrected with MET     Pt/family was provided educational information, including: role of PT, goals for PT, scheduling - pt verbalized understanding. Discussed insurance plan with pt.     TREATMENT   Time In: 11:13 AM  Time Out: 12:10 PM     Discussed Plan of Care with patient: Yes    Linda received 15 minutes of therapeutic exercise including:   -Push pull to correct anterior ilium rotation L   -Piriformis stretch   -TA contractions  -Bridge/pelvic curl   -S/L hip abduction   Instructions on standing posture  TA and glute activation with improved pelvic alignment, recommend flats vs. Heels for working        Written Home Exercises Provided: yes  Exercises were reviewed and Linda was able to demonstrate them prior to the end of the session. Pt received a written copy of exercises to perform at home. Linda  demonstrated good  understanding of the education provided.     Assessment   Linda is a 27 y.o. female referred to outpatient physical therapy with a medical diagnosis of back and neck pain  .     Demonstrates limitations as described in the problem list.  Medical necessity is demonstrated by the following IMPAIRMENTS/PROBLEMS:  weakness, impaired endurance, decreased lower extremity function, pain, decreased ROM, impaired joint extensibility and impaired muscle length      Positive prognostic factors include age, healing potential .   Negative prognostic factors include  Posture deficits .   Pt prognosis is Good.    Pt will benefit from skilled outpatient physical therapy to address the above stated deficits, provide pt/family education, and to maximize pt's level of independence.       History  Co-morbidities and personal factors that may impact the plan of care Examination  Body Structures and Functions, activity limitations and participation restrictions that may impact the plan of care Clinical Presentation   Decision Making/ Complexity Score   Co-morbidities:   none         Personal Factors:   no deficits Body Regions:   neck  lower extremities  trunk    Body Systems:   gross symmetry  ROM  strength  gross coordinated movement  motor control    Activity limitations:   Learning and applying knowledge  no deficits    General Tasks and Commands  no deficits    Communication  no deficits    Mobility  walking    Self care  no deficits    Domestic Life  no deficits    Life Areas  neck pain with work activities     Community and Social Life  recreation and leisure    Participation Restrictions:    none          stable and uncomplicated            low low low low         Anticipated Barriers for physical therapy: none     GOALS: 6 - 8 weeks:   -Indep with HEP for mgmt of neck and back pain sx   -Improved hip and lumbar ROM WFL without pain for improved postural alignment and stability   -Improved glute, anterior core  and lower scap stability/strength WFL for decreased pain with daily activity   -FOTO reporting to predicted level of limitation         Plan     Certification Period: 3/26/18 - 5/26/18      Outpatient physical therapy 1- 2 times weekly to include: pt ed, HEP, therapeutic exercises, therapeutic activities, neuromuscular re-education/ balance exercises, manual therapy, and modalities prn. Cont PT for  6-8  weeks. Pt may be seen by PTA as part of the rehabilitation team.     I certify the need for these services furnished under this plan of treatment and while under my care.    Dominic Falcon, PT

## 2018-03-27 LAB
MITOGEN NIL: 9.42 IU/ML
NIL: 0.04 IU/ML
TB ANTIGEN NIL: -0 IU/ML
TB ANTIGEN: 0.04 IU/ML
TB GOLD: NEGATIVE

## 2018-03-27 NOTE — PLAN OF CARE
OUTPATIENT PHYSICAL THERAPY  PHYSICAL THERAPY EVALUATION    Name: Linda Sherwood  Clinic Number: [id number]    Evaluation Date: 03/26/2018  Visit #: 1  Precautions: standard     Diagnosis:Low Back Pain and Neck Pain   Physician: Marlena Jimenes MD  Treatment Orders: PT Eval and Treat  Past Medical History:   Diagnosis Date    Anxiety     Attention deficit disorder with hyperactivity 01/15/2018    Depression      Current Outpatient Prescriptions   Medication Sig    ADDERALL XR 20 mg 24 hr capsule Take 1 capsule by mouth once daily.    naproxen (NAPROSYN) 500 MG tablet Take 1 tablet (500 mg total) by mouth 2 (two) times daily.    XULANE 150-35 mcg/24 hr Place 1 patch onto the skin once a week.     No current facility-administered medications for this visit.      Review of patient's allergies indicates:  No Known Allergies    History   Prior Therapy/PMH: none   Social History:  with 2 kids, works full time   Previous functional status: ongoing pain in low back and hips   Current functional status: increased pain in hips and low back , with onset of neck pain increased with working  Work: works as     Subjective   History of Present Illness: ongoing back and hip pain increased with walking or sitting, increased tension in neck and shoulders from working as a    Chief complaint: low back and hip pain ongoing, and onset of neck pain   Pain: current 3/10 neck, 4/10 back and hips;  worst 7/10 neck; 8/10 back and hips, best 2/10, Aching and Tight  Radicular symptoms: none  Aggravating factors: working and looking down a lot, walking and sitting are the worst for the back and hips  Easing factors: aleve, massage  Pts goals: decrease pain in her back, hips and neck     Objective   Mental status: alert    Static Postural Assessment: forward head, anterior pelvic tilt,     GAIT: Linda ambulates with no assistive device with independently.     GAIT DEVIATIONS: Linda displays decreased  silvana    Dynamic Movement Screen:  - Cervical Flexion: FN   - Cervical Ext: FN   - Cervical Rot R: DN ~ tight upper traps   - Cervical Rot L: DN ~ tight upper traps   - Apley ER R: NT  - Apley ER L: NT  - Apley IR R: NT  - Apley IR L:  NT  - Trunk Flexion: DN ~ tight lumbar paraspinals   - Trunk Ext: DN Hinge at low back ~ decreased thoracic extension  - Trunk Rot R: FN  - Trunk Rot L: DN ~ decreased pelvic rotation/Hip ER   - SLS R: NT  - SLS L:NT     * FN =Functional Non-painful   * FP = Functional Painful   * DN = Dysfunctional Non-painful   * DP = Dysfunctional Painful        - Overhead Squat Assessment (OHSA):   ~Not performed     ROM:  Lumbar flexion 50% limited ~ tight paraspinals and anterior hip flexors   +tight hip ER bilaterally L > R   + tight gastrocs ~ ankle DF to neutral       Strength:   TA = 1   Glute Max strength = 2 Bar   Glute Med: R = 2/ L = 1   Lower Scap stab = 1       Level of strength:   3 = Normal   2 = Compensation (recruits other muscles)  1= Weak    Special Tests:  + ROMINA Bar anterior hip tightness; Left hip pain with overpressure     Palpation:  L anterior pelvic rotation ~ corrected with MET     Pt/family was provided educational information, including: role of PT, goals for PT, scheduling - pt verbalized understanding. Discussed insurance plan with pt.     TREATMENT   Time In: 11:13 AM  Time Out: 12:10 PM     Discussed Plan of Care with patient: Yes    Linda received 15 minutes of therapeutic exercise including:   -Push pull to correct anterior ilium rotation L   -Piriformis stretch   -TA contractions  -Bridge/pelvic curl   -S/L hip abduction   Instructions on standing posture  TA and glute activation with improved pelvic alignment, recommend flats vs. Heels for working        Written Home Exercises Provided: yes  Exercises were reviewed and Linda was able to demonstrate them prior to the end of the session. Pt received a written copy of exercises to perform at home. Linda  demonstrated good  understanding of the education provided.     Assessment   Linda is a 27 y.o. female referred to outpatient physical therapy with a medical diagnosis of back and neck pain  .     Demonstrates limitations as described in the problem list.  Medical necessity is demonstrated by the following IMPAIRMENTS/PROBLEMS:  weakness, impaired endurance, decreased lower extremity function, pain, decreased ROM, impaired joint extensibility and impaired muscle length      Positive prognostic factors include age, healing potential .   Negative prognostic factors include  Posture deficits .   Pt prognosis is Good.    Pt will benefit from skilled outpatient physical therapy to address the above stated deficits, provide pt/family education, and to maximize pt's level of independence.       History  Co-morbidities and personal factors that may impact the plan of care Examination  Body Structures and Functions, activity limitations and participation restrictions that may impact the plan of care Clinical Presentation   Decision Making/ Complexity Score   Co-morbidities:   none         Personal Factors:   no deficits Body Regions:   neck  lower extremities  trunk    Body Systems:   gross symmetry  ROM  strength  gross coordinated movement  motor control    Activity limitations:   Learning and applying knowledge  no deficits    General Tasks and Commands  no deficits    Communication  no deficits    Mobility  walking    Self care  no deficits    Domestic Life  no deficits    Life Areas  neck pain with work activities     Community and Social Life  recreation and leisure    Participation Restrictions:    none          stable and uncomplicated            low low low low         Anticipated Barriers for physical therapy: none     GOALS: 6 - 8 weeks:   -Indep with HEP for mgmt of neck and back pain sx   -Improved hip and lumbar ROM WFL without pain for improved postural alignment and stability   -Improved glute, anterior core  and lower scap stability/strength WFL for decreased pain with daily activity   -FOTO reporting to predicted level of limitation         Plan     Certification Period: 3/26/18 - 5/26/18      Outpatient physical therapy 1- 2 times weekly to include: pt ed, HEP, therapeutic exercises, therapeutic activities, neuromuscular re-education/ balance exercises, manual therapy, and modalities prn. Cont PT for  6-8  weeks. Pt may be seen by PTA as part of the rehabilitation team.     I certify the need for these services furnished under this plan of treatment and while under my care.    Dominic Falcon, PT

## 2018-04-04 ENCOUNTER — LAB VISIT (OUTPATIENT)
Dept: LAB | Facility: HOSPITAL | Age: 28
End: 2018-04-04
Attending: INTERNAL MEDICINE
Payer: COMMERCIAL

## 2018-04-04 DIAGNOSIS — G89.29 CHRONIC BILATERAL LOW BACK PAIN WITHOUT SCIATICA: ICD-10-CM

## 2018-04-04 DIAGNOSIS — M54.50 CHRONIC BILATERAL LOW BACK PAIN WITHOUT SCIATICA: ICD-10-CM

## 2018-04-04 LAB — HCG SERPL QL: NEGATIVE

## 2018-04-04 PROCEDURE — 84703 CHORIONIC GONADOTROPIN ASSAY: CPT | Mod: PO

## 2018-04-04 PROCEDURE — 36415 COLL VENOUS BLD VENIPUNCTURE: CPT | Mod: PO

## 2018-04-05 ENCOUNTER — HOSPITAL ENCOUNTER (OUTPATIENT)
Dept: RADIOLOGY | Facility: HOSPITAL | Age: 28
Discharge: HOME | End: 2018-04-05
Attending: INTERNAL MEDICINE
Payer: COMMERCIAL

## 2018-04-05 DIAGNOSIS — G89.29 CHRONIC BILATERAL LOW BACK PAIN WITHOUT SCIATICA: ICD-10-CM

## 2018-04-05 DIAGNOSIS — M54.50 CHRONIC BILATERAL LOW BACK PAIN WITHOUT SCIATICA: ICD-10-CM

## 2018-04-05 DIAGNOSIS — M54.2 NECK PAIN: ICD-10-CM

## 2018-04-05 PROCEDURE — 25500020 PHARM REV CODE 255: Performed by: INTERNAL MEDICINE

## 2018-04-05 PROCEDURE — 72197 MRI PELVIS W/O & W/DYE: CPT | Mod: TC

## 2018-04-05 PROCEDURE — 72197 MRI PELVIS W/O & W/DYE: CPT | Mod: 26,,, | Performed by: RADIOLOGY

## 2018-04-05 PROCEDURE — A9585 GADOBUTROL INJECTION: HCPCS | Performed by: INTERNAL MEDICINE

## 2018-04-05 RX ORDER — GADOBUTROL 604.72 MG/ML
6 INJECTION INTRAVENOUS
Status: COMPLETED | OUTPATIENT
Start: 2018-04-05 | End: 2018-04-05

## 2018-04-05 RX ADMIN — GADOBUTROL 6 ML: 604.72 INJECTION INTRAVENOUS at 04:04

## 2018-04-06 ENCOUNTER — PATIENT MESSAGE (OUTPATIENT)
Dept: RHEUMATOLOGY | Facility: CLINIC | Age: 28
End: 2018-04-06

## 2018-04-09 ENCOUNTER — CLINICAL SUPPORT (OUTPATIENT)
Dept: REHABILITATION | Facility: HOSPITAL | Age: 28
End: 2018-04-09
Payer: COMMERCIAL

## 2018-04-09 ENCOUNTER — PATIENT MESSAGE (OUTPATIENT)
Dept: RHEUMATOLOGY | Facility: CLINIC | Age: 28
End: 2018-04-09

## 2018-04-09 DIAGNOSIS — M54.50 CHRONIC BILATERAL LOW BACK PAIN WITHOUT SCIATICA: Primary | ICD-10-CM

## 2018-04-09 DIAGNOSIS — M54.2 NECK PAIN: ICD-10-CM

## 2018-04-09 DIAGNOSIS — G89.29 CHRONIC BILATERAL LOW BACK PAIN WITHOUT SCIATICA: Primary | ICD-10-CM

## 2018-04-09 PROCEDURE — 97110 THERAPEUTIC EXERCISES: CPT | Mod: PN

## 2018-04-09 NOTE — PROGRESS NOTES
Name: Linda Sherwood  [facility] Number: [id number]  Date of Treatment: 04/09/2018   Diagnosis:  Low back pain     Time in: 1110   Time Out: 1200  Total Treatment Time:  *50   Group Time: 0     Subjective:    Linda reports improvement of symptoms.  Patient reports their pain to be 2/10 on a 0-10 scale with 0 being no pain and 10 being the worst pain imaginable.    Objective:         Linda received therapeutic exercises to develop strength, ROM, flexibility, posture and core stabilization for 50 minutes including:    -Push/pull to correct L anteriror pelvic rotation   -Piriformis stretch Figure 4 10 x 10 sec   -QP cat/camel and rock backs; progressed to  Down dog stretch  -Glute activation : clamshells with RTB, s/l hip abd, birdiges 3 x 10   -QP bird dogs x 10   -Hip abd walk with RTB x 1 lap   -pilates roll ups from long sitting x 10     Written Home Exercises Provided: yes   Pt demo good understanding of the education provided. Linda demonstrated good return demonstration of activities.     Assessment: L anterior pelvic rotation, tight piriformis and decreased glute activation on L side       Pt will continue to benefit from skilled PT intervention. Medical Necessity is demonstrated by:  Requires skilled supervision to complete and progress HEP and Weakness.    Patient is making good progress towards established goals.    New/Revised goals:        Plan:  Continue with established Plan of Care towards PT goals.

## 2018-04-12 ENCOUNTER — OFFICE VISIT (OUTPATIENT)
Dept: RHEUMATOLOGY | Facility: CLINIC | Age: 28
End: 2018-04-12
Payer: COMMERCIAL

## 2018-04-12 VITALS
HEIGHT: 64 IN | WEIGHT: 124.13 LBS | DIASTOLIC BLOOD PRESSURE: 81 MMHG | BODY MASS INDEX: 21.19 KG/M2 | SYSTOLIC BLOOD PRESSURE: 120 MMHG | HEART RATE: 91 BPM

## 2018-04-12 DIAGNOSIS — G89.29 CHRONIC BILATERAL LOW BACK PAIN WITHOUT SCIATICA: Primary | ICD-10-CM

## 2018-04-12 DIAGNOSIS — M54.50 CHRONIC BILATERAL LOW BACK PAIN WITHOUT SCIATICA: Primary | ICD-10-CM

## 2018-04-12 DIAGNOSIS — K21.9 GASTROESOPHAGEAL REFLUX DISEASE, ESOPHAGITIS PRESENCE NOT SPECIFIED: ICD-10-CM

## 2018-04-12 PROCEDURE — 99999 PR PBB SHADOW E&M-EST. PATIENT-LVL III: CPT | Mod: PBBFAC,,, | Performed by: INTERNAL MEDICINE

## 2018-04-12 PROCEDURE — 99214 OFFICE O/P EST MOD 30 MIN: CPT | Mod: S$GLB,,, | Performed by: INTERNAL MEDICINE

## 2018-04-12 RX ORDER — NORETHINDRONE ACETATE AND ETHINYL ESTRADIOL 1MG-20(21)
1 KIT ORAL DAILY
COMMUNITY
End: 2022-09-13

## 2018-04-12 RX ORDER — OMEPRAZOLE 20 MG/1
20 CAPSULE, DELAYED RELEASE ORAL DAILY
Qty: 30 CAPSULE | Refills: 11 | Status: SHIPPED | OUTPATIENT
Start: 2018-04-12 | End: 2018-04-12 | Stop reason: SDUPTHER

## 2018-04-12 RX ORDER — OMEPRAZOLE 20 MG/1
20 CAPSULE, DELAYED RELEASE ORAL DAILY
Qty: 30 CAPSULE | Refills: 11 | Status: SHIPPED | OUTPATIENT
Start: 2018-04-12 | End: 2022-09-13

## 2018-04-12 ASSESSMENT — ROUTINE ASSESSMENT OF PATIENT INDEX DATA (RAPID3)
TOTAL RAPID3 SCORE: 2
FATIGUE SCORE: 5
PATIENT GLOBAL ASSESSMENT SCORE: 3
PAIN SCORE: 2
PSYCHOLOGICAL DISTRESS SCORE: 2.2
MDHAQ FUNCTION SCORE: .3
WHEN YOU AWAKENED IN THE MORNING OVER THE LAST WEEK, PLEASE INDICATE THE AMOUNT OF TIME IT TAKES UNTIL YOU ARE AS LIMBER AS YOU WILL BE FOR THE DAY: 45 MINS
AM STIFFNESS SCORE: 1, YES

## 2018-04-12 NOTE — PROGRESS NOTES
"Subjective:       Patient ID: Linda Sherwood is a 27 y.o. female.    Chief Complaint: No chief complaint on file.    HPI       This is a 26 yo F with history of ADHD, depression/anxiety who presents with pain in bilateral hips and lower back x 4-5 years and pain and stiffness in neck x 1 year. In terms of the hip pain, patient reports that she has significant pain in the groin area on both sides. Also with pain in the lower back. The pain is worse when she is in one position for long periods of time. Walking becomes uncomfortable after 1 hour and she feels like she has to "drag her feet" to walk. Also is not able to sleep well at night due to pain. Wakes up in the morning with stiffness which can last an hour. Had pelvic xrays done by PCP and was told that she has some "degenerative changes" in the joints. For the past two weeks has been taking either Naproxen or Advil daily. Usually either takes one Naproxen or two Advil. Both help the pain. In terms of the neck pain, patient reports that it is worse at night and is usually associated with headaches at the back of the head. Applies biofreeze or heating pads and both help.  Has bilateral heel pain but only when wearing flat shoes. Has bilateral wrist pain associated with some numbness and tingling in the fingers. Uses bilateral wrists splints. No joint swelling, enlarged/swollen fingers, fevers, nausea, vomiting, eye redness, pleurisy, oral/nasal ulcers, skin rashes, hx of psoriasis, sinus problems, weakness, Raynaud's, abdominal pain, nail pitting, weight changes, diarrhea, hx of IBD, urinary symptoms, muscle pain, hair loss. Had one miscarriage in the first trimester but then had two normal deliveries. No family history of autoimmune disease or spondyloarthopathy. Works as a  and is on her feet for 5-6 hours a day. No smoking or alcohol.    Interval history:  Patient here for follow-up of lower back pain. Since last visit, she has started going to " physical therapy which has improved her pain significantly. She also does stretches that she has learned in physical therapy two times a day. Has also gone to massage therapy for her neck and now the neck stiffness has improved. Takes Naproxen 500 mg at night with dinner. Is unable to take the Naproxen in the morning due to reflux symptoms. Does feel that the naproxen helps with the pain. Does not take any other pain medications.  Has also gotten more comfortable shoes which has been helping with heel pain. Overall her pain is around a 3/10. Does feel that the pain is worse with sitting or standing for a long time. Improves with movement.  No skin rashes, diarrhea, vision changes, muscle weakness/pain.     Past Medical History:   Diagnosis Date    Anxiety     Attention deficit disorder with hyperactivity 01/15/2018    Depression      Past Surgical History:   Procedure Laterality Date    INTESTINAL MALROTATION REPAIR       Social History   Substance Use Topics    Smoking status: Former Smoker    Smokeless tobacco: Never Used    Alcohol use No     Family History   Problem Relation Age of Onset    Heart disease Paternal Grandfather      Review of patient's allergies indicates:  No Known Allergies      Current Outpatient Prescriptions:     ADDERALL XR 20 mg 24 hr capsule, Take 1 capsule by mouth once daily., Disp: , Rfl: 0    naproxen (NAPROSYN) 500 MG tablet, Take 1 tablet (500 mg total) by mouth 2 (two) times daily., Disp: 60 tablet, Rfl: 3    norethindrone-ethinyl estradiol (JUNEL FE 1/20) 1 mg-20 mcg (21)/75 mg (7) per tablet, Take 1 tablet by mouth once daily., Disp: , Rfl:     omeprazole (PRILOSEC) 20 MG capsule, Take 1 capsule (20 mg total) by mouth once daily., Disp: 30 capsule, Rfl: 11      Review of Systems   Constitutional: Negative for chills, fatigue, fever and unexpected weight change.   HENT: Negative for congestion, mouth sores, sinus pain and trouble swallowing.    Eyes: Negative for  "photophobia and redness.   Respiratory: Negative for cough, chest tightness and shortness of breath.    Cardiovascular: Negative for chest pain.   Gastrointestinal: Negative for abdominal distention, abdominal pain, blood in stool, constipation, diarrhea, nausea and vomiting.   Endocrine: Negative for cold intolerance.   Genitourinary: Negative for difficulty urinating, dysuria and genital sores.   Musculoskeletal: Positive for back pain, neck pain and neck stiffness. Negative for arthralgias, joint swelling and myalgias.   Skin: Negative for rash.   Neurological: Negative for dizziness and weakness.   Hematological: Does not bruise/bleed easily.   Psychiatric/Behavioral: Negative for agitation.         Objective:   /81   Pulse 91   Ht 5' 3.6" (1.615 m)   Wt 56.3 kg (124 lb 1.6 oz)   BMI 21.57 kg/m²      Physical Exam   Constitutional: She is oriented to person, place, and time and well-developed, well-nourished, and in no distress.   HENT:   Head: Normocephalic and atraumatic.   Eyes: EOM are normal. Pupils are equal, round, and reactive to light.   Neck: Normal range of motion. Neck supple.   Cardiovascular: Normal rate and regular rhythm.    Pulmonary/Chest: Effort normal and breath sounds normal.   Abdominal: Soft. Bowel sounds are normal.   Neurological: She is alert and oriented to person, place, and time.   Skin: Skin is warm.     Psychiatric: Affect normal.   Musculoskeletal: Normal range of motion.   Normal Schober's test  No tenderness to palpation of SI joints/lower back  Negative FADIR and FADER  Normal range of motion at neck  Normal chest expansion           Results for POOL LANDEROS (MRN [id number]) as of 3/22/2018 15:48   Ref. Range 3/12/2018 11:58 3/22/2018 12:29   WBC Latest Ref Range: 3.90 - 12.70 K/uL 6.52    RBC Latest Ref Range: 4.00 - 5.40 M/uL 4.22    Hemoglobin Latest Ref Range: 12.0 - 16.0 g/dL 12.5    Hematocrit Latest Ref Range: 37.0 - 48.5 % 36.3 (L)    MCV Latest Ref Range: " 82 - 98 fL 86    MCH Latest Ref Range: 27.0 - 31.0 pg 29.6    MCHC Latest Ref Range: 32.0 - 36.0 g/dL 34.4    RDW Latest Ref Range: 11.5 - 14.5 % 12.2    Platelets Latest Ref Range: 150 - 350 K/uL 226    MPV Latest Ref Range: 9.2 - 12.9 fL 11.5    Gran% Latest Ref Range: 38.0 - 73.0 % 65.3    Gran # (ANC) Latest Ref Range: 1.8 - 7.7 K/uL 4.3    Immature Granulocytes Latest Ref Range: 0.0 - 0.5 % 0.2    Immature Grans (Abs) Latest Ref Range: 0.00 - 0.04 K/uL 0.01    Lymph% Latest Ref Range: 18.0 - 48.0 % 26.2    Lymph # Latest Ref Range: 1.0 - 4.8 K/uL 1.7    Mono% Latest Ref Range: 4.0 - 15.0 % 4.6    Mono # Latest Ref Range: 0.3 - 1.0 K/uL 0.3    Eosinophil% Latest Ref Range: 0.0 - 8.0 % 2.8    Eos # Latest Ref Range: 0.0 - 0.5 K/uL 0.2    Basophil% Latest Ref Range: 0.0 - 1.9 % 0.9    Baso # Latest Ref Range: 0.00 - 0.20 K/uL 0.06    nRBC Latest Ref Range: 0 /100 WBC 0    Sed Rate Latest Ref Range: 0 - 20 mm/Hr  7      Results for POOL LANDEROS (MRN [id number]) as of 3/22/2018 15:48   Ref. Range 3/22/2018 12:29   Sodium Latest Ref Range: 136 - 145 mmol/L 138   Potassium Latest Ref Range: 3.5 - 5.1 mmol/L 3.7   Chloride Latest Ref Range: 95 - 110 mmol/L 105   CO2 Latest Ref Range: 23 - 29 mmol/L 27   Anion Gap Latest Ref Range: 8 - 16 mmol/L 6 (L)   BUN, Bld Latest Ref Range: 6 - 20 mg/dL 11   Creatinine Latest Ref Range: 0.5 - 1.4 mg/dL 0.8   eGFR if non African American Latest Ref Range: >60 mL/min/1.73 m^2 >60.0   eGFR if African American Latest Ref Range: >60 mL/min/1.73 m^2 >60.0   Glucose Latest Ref Range: 70 - 110 mg/dL 92   Calcium Latest Ref Range: 8.7 - 10.5 mg/dL 9.5   Alkaline Phosphatase Latest Ref Range: 55 - 135 U/L 65   Total Protein Latest Ref Range: 6.0 - 8.4 g/dL 7.1   Albumin Latest Ref Range: 3.5 - 5.2 g/dL 4.0   Total Bilirubin Latest Ref Range: 0.1 - 1.0 mg/dL 0.5   AST Latest Ref Range: 10 - 40 U/L 14   ALT Latest Ref Range: 10 - 44 U/L 10   CRP Latest Ref Range: 0.0 - 8.2 mg/L 3.8    Results for POOL LANDEROS (MRN [id number]) as of 4/12/2018 16:43   Ref. Range 4/10/2014 14:09 3/22/2018 12:29   Rapid Strep A Screen Latest Ref Range: Negative  Positive (A)    Hep B Core Total Ab Unknown  Negative   Hep B S Ab Unknown  Positive (A)   Hepatitis B Surface Ag Unknown  Negative   Hepatitis C Ab Unknown  Negative   Mitogen - Nil Latest Ref Range: See text IU/mL  9.424   NIL Latest Ref Range: See text IU/mL  0.039   TB Antigen Latest Ref Range: See text IU/mL  0.036   TB Antigen - Nil Latest Ref Range: See Text IU/mL  -0.003   TB Gold Unknown  Negative   HIV 1/2 Ag/Ab Latest Ref Range: Negative   Negative   RPR Latest Ref Range: Non-reactive   Non-reactive   Results for POOL LANDEROS (MRN [id number]) as of 4/12/2018 16:43   Ref. Range 3/22/2018 12:29   B27 Testing Date Unknown 04/02/2018 12:00 AM   HLA B27 Result Unknown Negative   Results for POOL LANDEROS (MRN [id number]) as of 4/12/2018 16:43   Ref. Range 3/22/2018 12:29   Anti-SSA Antibody Latest Ref Range: 0.00 - 19.99 EU 0.58   Anti-SSA Interpretation Latest Ref Range: Negative  Negative   CCP Antibodies Latest Ref Range: <5.0 U/mL <0.5   Rheumatoid Factor Latest Ref Range: 0.0 - 15.0 IU/mL <10.0   Results for POOL LANDEROS (MRN [id number]) as of 4/12/2018 16:43   Ref. Range 3/22/2018 12:29   IMTIAZ Screen Latest Ref Range: Negative <1:160  Negative <1:160       Xray hips bilateral 3/14/18:  Narrative     EXAMINATION:  XR HIPS BILATERAL 2 VIEW INCL AP PELVIS    CLINICAL HISTORY:  bilateral hip pain;  Pain in right hip    TECHNIQUE:  A single AP view of the bony pelvis and hips and frog leg views of the left and right hip were acquired.    COMPARISON:  None.    FINDINGS:  No acute fracture, dislocation, or osseous destructive process.  There is degenerative change of the sacroiliac joints, left greater than right.  No osteonecrosis of the femoral heads.  No hip joint space narrowing.  No osteitis pubis.  The soft tissue structures of the  abdomen and pelvis are unremarkable.   Impression       As above     Cervical spine xray 3/22/18:  Narrative     EXAMINATION:  XR CERVICAL SPINE 5 VIEW WITH FLEX AND EXT    CLINICAL HISTORY:  Cervicalgia    TECHNIQUE:  Five views of the cervical spine plus flexion and extension views were performed.    COMPARISON:  None.    FINDINGS:  The cervical vertebral bodies maintain normal height and alignment.  The disc spaces are preserved.  The facet joints maintain normal articulation.  The odontoid process is intact.  On flexion, there is minimal physiologic anterolisthesis of C3 on C4 and C4 on C5.  Alignment is normal on extension.  There is no significant foraminal stenosis.  The prevertebral soft tissues are normal.  The airway is patent.   Impression       1. Normal cervical spine.  Please see above discussion.     MRI SI joints w/ and w/o contrast 4/5/18:  Narrative     EXAMINATION:  MRI SACROILIAC JOINTS W W/O CONTRAST    CLINICAL HISTORY:  Back or sacroiliac sx, inflammatory, chronic, spondyloarthropathy suspected, first study;  Low back pain    TECHNIQUE:  Multiplanar, multisequence images through the sacroiliac joints were performed before and after the administration of 6 cc of Gadavist intravenous contrast.    COMPARISON:  Hip radiographs 03/14/2018    FINDINGS:  No evidence of acute fracture or bone marrow replacement process.  The sacroiliac joints and pubic symphysis appear within normal limits.    The visualized hip joints appear within normal limits.  The femoral heads maintain normal spherical contour.  The femoral head-neck junctions are within normal limits.  Acetabula are normally anteverted.    Limited evaluation of the intrapelvic contents demonstrates no significant abnormalities.  There is trace free pelvic fluid, likely physiologic.  Urinary bladder demonstrates no significant focal thickening.  No lymphadenopathy.    Soft tissue structures are within normal limits.   Impression       Normal MRI  appearance of the sacroiliac joints.    Trace amount of pelvic free fluid, likely physiologic.    Electronically signed by resident: Praful Subramanian  Date: 04/06/2018  Time: 08:05    Electronically signed by: Rufino Patino MD  Date: 04/06/2018  Time: 12:08         Assessment:       1. Chronic bilateral low back pain without sciatica    2. Gastroesophageal reflux disease, esophagitis presence not specified        This is a 28 yo F with history of ADHD, depression/anxiety who initially presented with pain in bilateral hips and lower back x 4-5 years and pain and stiffness in neck x 1 year. She has symptoms of an inflammatory back pain such as no improvement with rest, pain at night, insidious onset, and age <40 years. No arthritis, uveitis, dactylitis, psoriasis, IBD, family history of SpA. Her hip xrays show pseudo-widening of joint space; grade 1-2 sacroiliac changes however her MRI of the SI joints is normal with no signs of inflammation. HLA B27 and inflammatory markers are also normal. On exam today, normal Schober's test and no tenderness over SI joints.  She appears to have inflammatory back pain but she does not meet criteria for diagnosis of an axial spondyloarthritis.   Would recommend continuing physical therapy and NSAIDs since she has had a good response.   Neck stiffness and pain minimal now after massage therapy. Cervical xray normal.   Plan:       - continue Naproxen 500 mg BID only as needed. Take Omeprazole 20 mg on the days she takes Naproxen to avoid reflux.  - continue physical therapy. Also recommend aerobic exercise and garima chi which will also help improve symptoms.    RTC in 3 months

## 2018-04-12 NOTE — PROGRESS NOTES
Answers for HPI/ROS submitted by the patient on 4/6/2018   fever: No  eye redness: No  headaches: Yes  shortness of breath: No  chest pain: No  trouble swallowing: No  diarrhea: No  constipation: No  unexpected weight change: No  genital sore: No  dysuria: No  During the last 3 days, have you had a skin rash?: No  Bruises or bleeds easily: No  cough: No

## 2018-04-12 NOTE — PATIENT INSTRUCTIONS
1. Try aerobic exercise, water aerobics, garima chi  2. Continue physical therapy  3. Take Naproxen only as needed. If you take it, make sure to take omeprazole on same.

## 2020-10-05 ENCOUNTER — PATIENT MESSAGE (OUTPATIENT)
Dept: ADMINISTRATIVE | Facility: HOSPITAL | Age: 30
End: 2020-10-05

## 2020-10-21 LAB
PAP RECOMMENDATION EXT: NORMAL
PAP SMEAR: NORMAL

## 2020-12-09 ENCOUNTER — LAB VISIT (OUTPATIENT)
Dept: PRIMARY CARE CLINIC | Facility: OTHER | Age: 30
End: 2020-12-09
Attending: INTERNAL MEDICINE
Payer: COMMERCIAL

## 2020-12-09 DIAGNOSIS — Z03.818 ENCOUNTER FOR OBSERVATION FOR SUSPECTED EXPOSURE TO OTHER BIOLOGICAL AGENTS RULED OUT: ICD-10-CM

## 2020-12-09 PROCEDURE — U0003 INFECTIOUS AGENT DETECTION BY NUCLEIC ACID (DNA OR RNA); SEVERE ACUTE RESPIRATORY SYNDROME CORONAVIRUS 2 (SARS-COV-2) (CORONAVIRUS DISEASE [COVID-19]), AMPLIFIED PROBE TECHNIQUE, MAKING USE OF HIGH THROUGHPUT TECHNOLOGIES AS DESCRIBED BY CMS-2020-01-R: HCPCS

## 2020-12-11 DIAGNOSIS — U07.1 COVID-19 VIRUS DETECTED: ICD-10-CM

## 2020-12-11 LAB — SARS-COV-2 RNA RESP QL NAA+PROBE: DETECTED

## 2020-12-18 ENCOUNTER — NURSE TRIAGE (OUTPATIENT)
Dept: ADMINISTRATIVE | Facility: CLINIC | Age: 30
End: 2020-12-18

## 2020-12-18 NOTE — TELEPHONE ENCOUNTER
Patient reports elevated HR for > 2 hrs approx 120bpm, she also reports associated sob and cp.  Advised go to ED now.    Reason for Disposition   Chest pain   MILD difficulty breathing (e.g., minimal/no SOB at rest, SOB with walking, pulse <100)    Additional Information   Negative: Severe difficulty breathing (e.g., struggling for each breath, speaks in single words)   Negative: Difficult to awaken or acting confused (e.g., disoriented, slurred speech)   Negative: Bluish (or gray) lips or face now   Negative: Shock suspected (e.g., cold/pale/clammy skin, too weak to stand, low BP, rapid pulse)   Negative: Sounds like a life-threatening emergency to the triager   Negative: SEVERE or constant chest pain or pressure (Exception: mild central chest pain, present only when coughing)   Negative: MODERATE difficulty breathing (e.g., speaks in phrases, SOB even at rest, pulse 100-120)    Protocols used: CORONAVIRUS (COVID-19) DIAGNOSED OR [id number]

## 2021-01-04 ENCOUNTER — PATIENT MESSAGE (OUTPATIENT)
Dept: ADMINISTRATIVE | Facility: HOSPITAL | Age: 31
End: 2021-01-04

## 2021-04-29 ENCOUNTER — PATIENT MESSAGE (OUTPATIENT)
Dept: RESEARCH | Facility: HOSPITAL | Age: 31
End: 2021-04-29

## 2021-10-15 ENCOUNTER — PATIENT OUTREACH (OUTPATIENT)
Dept: ADMINISTRATIVE | Facility: HOSPITAL | Age: 31
End: 2021-10-15

## 2022-09-01 ENCOUNTER — PATIENT MESSAGE (OUTPATIENT)
Dept: FAMILY MEDICINE | Facility: CLINIC | Age: 32
End: 2022-09-01
Payer: COMMERCIAL

## 2022-09-13 ENCOUNTER — OFFICE VISIT (OUTPATIENT)
Dept: FAMILY MEDICINE | Facility: CLINIC | Age: 32
End: 2022-09-13
Payer: COMMERCIAL

## 2022-09-13 VITALS
TEMPERATURE: 99 F | WEIGHT: 122.13 LBS | BODY MASS INDEX: 21.64 KG/M2 | HEIGHT: 63 IN | SYSTOLIC BLOOD PRESSURE: 118 MMHG | OXYGEN SATURATION: 99 % | RESPIRATION RATE: 16 BRPM | DIASTOLIC BLOOD PRESSURE: 76 MMHG | HEART RATE: 87 BPM

## 2022-09-13 DIAGNOSIS — F90.9 ATTENTION DEFICIT DISORDER WITH HYPERACTIVITY: ICD-10-CM

## 2022-09-13 DIAGNOSIS — F41.9 ANXIETY: Primary | ICD-10-CM

## 2022-09-13 DIAGNOSIS — Z23 NEED FOR PROPHYLACTIC VACCINATION AGAINST HEPATITIS B VIRUS: ICD-10-CM

## 2022-09-13 PROBLEM — M54.2 NECK PAIN: Status: RESOLVED | Noted: 2018-03-22 | Resolved: 2022-09-13

## 2022-09-13 PROBLEM — G89.29 CHRONIC BILATERAL LOW BACK PAIN WITHOUT SCIATICA: Status: ACTIVE | Noted: 2018-03-22

## 2022-09-13 PROBLEM — F32.A DEPRESSION: Status: ACTIVE | Noted: 2022-09-13

## 2022-09-13 PROCEDURE — 1159F MED LIST DOCD IN RCRD: CPT | Mod: CPTII,S$GLB,, | Performed by: STUDENT IN AN ORGANIZED HEALTH CARE EDUCATION/TRAINING PROGRAM

## 2022-09-13 PROCEDURE — 99999 PR PBB SHADOW E&M-EST. PATIENT-LVL III: CPT | Mod: PBBFAC,,, | Performed by: STUDENT IN AN ORGANIZED HEALTH CARE EDUCATION/TRAINING PROGRAM

## 2022-09-13 PROCEDURE — 90746 HEPB VACCINE 3 DOSE ADULT IM: CPT | Mod: S$GLB,,, | Performed by: STUDENT IN AN ORGANIZED HEALTH CARE EDUCATION/TRAINING PROGRAM

## 2022-09-13 PROCEDURE — 99999 PR PBB SHADOW E&M-EST. PATIENT-LVL III: ICD-10-PCS | Mod: PBBFAC,,, | Performed by: STUDENT IN AN ORGANIZED HEALTH CARE EDUCATION/TRAINING PROGRAM

## 2022-09-13 PROCEDURE — 99204 OFFICE O/P NEW MOD 45 MIN: CPT | Mod: 25,S$GLB,, | Performed by: STUDENT IN AN ORGANIZED HEALTH CARE EDUCATION/TRAINING PROGRAM

## 2022-09-13 PROCEDURE — 3008F BODY MASS INDEX DOCD: CPT | Mod: CPTII,S$GLB,, | Performed by: STUDENT IN AN ORGANIZED HEALTH CARE EDUCATION/TRAINING PROGRAM

## 2022-09-13 PROCEDURE — 99204 PR OFFICE/OUTPT VISIT, NEW, LEVL IV, 45-59 MIN: ICD-10-PCS | Mod: 25,S$GLB,, | Performed by: STUDENT IN AN ORGANIZED HEALTH CARE EDUCATION/TRAINING PROGRAM

## 2022-09-13 PROCEDURE — 90471 IMMUNIZATION ADMIN: CPT | Mod: S$GLB,,, | Performed by: STUDENT IN AN ORGANIZED HEALTH CARE EDUCATION/TRAINING PROGRAM

## 2022-09-13 PROCEDURE — 3008F PR BODY MASS INDEX (BMI) DOCUMENTED: ICD-10-PCS | Mod: CPTII,S$GLB,, | Performed by: STUDENT IN AN ORGANIZED HEALTH CARE EDUCATION/TRAINING PROGRAM

## 2022-09-13 PROCEDURE — 90471 HEPATITIS B VACCINE ADULT IM: ICD-10-PCS | Mod: S$GLB,,, | Performed by: STUDENT IN AN ORGANIZED HEALTH CARE EDUCATION/TRAINING PROGRAM

## 2022-09-13 PROCEDURE — 1159F PR MEDICATION LIST DOCUMENTED IN MEDICAL RECORD: ICD-10-PCS | Mod: CPTII,S$GLB,, | Performed by: STUDENT IN AN ORGANIZED HEALTH CARE EDUCATION/TRAINING PROGRAM

## 2022-09-13 PROCEDURE — 90746 HEPATITIS B VACCINE ADULT IM: ICD-10-PCS | Mod: S$GLB,,, | Performed by: STUDENT IN AN ORGANIZED HEALTH CARE EDUCATION/TRAINING PROGRAM

## 2022-09-13 RX ORDER — NAPROXEN 500 MG/1
500 TABLET ORAL DAILY PRN
COMMUNITY
End: 2022-10-13 | Stop reason: SDUPTHER

## 2022-09-13 RX ORDER — LISDEXAMFETAMINE DIMESYLATE CAPSULES 20 MG/1
20 CAPSULE ORAL EVERY MORNING
Qty: 30 CAPSULE | Refills: 0 | Status: SHIPPED | OUTPATIENT
Start: 2022-09-13 | End: 2022-10-13 | Stop reason: SDUPTHER

## 2022-09-13 RX ORDER — SERTRALINE HYDROCHLORIDE 25 MG/1
25 TABLET, FILM COATED ORAL DAILY
Qty: 30 TABLET | Refills: 0 | Status: SHIPPED | OUTPATIENT
Start: 2022-09-13 | End: 2022-10-13 | Stop reason: SDUPTHER

## 2022-09-13 NOTE — PROGRESS NOTES
"Subjective:      Patient ID: Linda Sherwood is a 32 y.o. 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 female with a PMHx as documented below presents to clinic today for the following:  - Pt seen in ED on 8/31/22 for URI - EKG obtained at ED visit showed NSR and "non-specific ST and T wave abnormality." Pt concerned about the read - on manual review by ED staff, pt's previous PCP, and my review - EKG okay with no clinically significant abnormalities. Reviewed results with patient today.   - Pt reports increased anxiety over the past year due to family health problems, working two jobs, having three kids, and currently taking pre-requisite courses for nursing school. Pt has tried Wellbutrin (did not tolerate 2/2 side effects) and Lexapro (did not tolerate, unsure of side effects experienced). Pt states that some of her anxiety stems from difficulty concentrating and staying on task - diagnosed with ADHD as a child, previously on Adderall but stopped 2/2 palpitations. Pt interested in restarting ADHD medication in addition to SSRI for anxiety.   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     Review of Systems   Constitutional:  Negative for chills and fever.   HENT:  Negative for congestion and sore throat.    Respiratory:  Negative for shortness of breath.    Cardiovascular:  Negative for chest pain.   Gastrointestinal:  Negative for abdominal pain, constipation, diarrhea, nausea and vomiting.   Genitourinary:  Negative for dysuria.   Musculoskeletal:  Negative for back pain and neck pain.   Skin:  Negative for rash.   Neurological:  Negative for dizziness, sensory change, weakness and headaches.   Psychiatric/Behavioral:  Negative for depression. The patient is not nervous/anxious.      Past Medical " History:   Diagnosis Date    Anxiety     Attention deficit disorder with hyperactivity 01/15/2018    Chronic bilateral low back pain without sciatica 3/22/2018    Depression     GERD (gastroesophageal reflux disease) 4/12/2018     Past Surgical History:   Procedure Laterality Date    AUGMENTATION OF BREAST  2009    INTESTINAL MALROTATION REPAIR       Review of patient's allergies indicates:  No Known Allergies    Family History   Problem Relation Age of Onset    Heart disease Paternal Grandfather     Ovarian cancer Sister 25     Social History     Tobacco Use    Smoking status: Former    Smokeless tobacco: Never   Substance Use Topics    Alcohol use: No    Drug use: No     Currently on File with [facility]:   Most Recent Immunizations   Administered Date(s) Administered    DTaP 10/04/1995    HIB 10/20/1992    HPV Quadrivalent 07/14/2008    Hepatitis B, Adult 09/13/2022    Hepatitis B, Pediatric/Adolescent 07/30/1996    IPV 10/04/1995    MMR 10/04/1995    Meningococcal Conjugate (MCV4P) 09/29/2008    Tdap 09/29/2008     Objective:      Vitals:    09/13/22 1547   BP: 118/76   Pulse: 87   Resp: 16   Temp: 98.6 °F (37 °C)       Physical Exam  Vitals reviewed.   Constitutional:       General: She is not in acute distress.  Cardiovascular:      Rate and Rhythm: Normal rate.      Heart sounds: Normal heart sounds. No murmur heard.  Pulmonary:      Effort: Pulmonary effort is normal. No respiratory distress.      Breath sounds: Normal breath sounds. No wheezing or rales.   Abdominal:      General: Bowel sounds are normal. There is no distension.      Palpations: Abdomen is soft.      Tenderness: There is no abdominal tenderness.   Skin:     General: Skin is warm and dry.   Neurological:      General: No focal deficit present.      Mental Status: She is alert and oriented to person, place, and time. Mental status is at baseline.      Assessment:       1. Anxiety    2. Attention deficit disorder with hyperactivity    3.  Need for prophylactic vaccination against hepatitis B virus        Plan:       Linda was seen today for er follow up  and immunizations.    Diagnoses and all orders for this visit:    Anxiety  -     sertraline (ZOLOFT) 25 MG tablet; Take 1 tablet (25 mg total) by mouth once daily.    Attention deficit disorder with hyperactivity  -     lisdexamfetamine (VYVANSE) 20 MG capsule; Take 1 capsule (20 mg total) by mouth every morning.    Need for prophylactic vaccination against hepatitis B virus  -     Hepatitis B Vaccine (Adult) (IM)  -     Hepatitis B Vaccine (Adult) (IM); Future  -     Hepatitis B Vaccine (Adult) (IM); Future         Follow up in about 4 weeks (around 10/11/2022), or if symptoms worsen or fail to improve, for med check and 2nd hep B vaccine.    Betsy Goodrich MD  [facility]  Office: [phone]   Fax: [phone]  09/13/2022    Disclaimer: This note was partly generated using dictation software which may occasionally result in transcription errors.

## 2022-10-13 ENCOUNTER — OFFICE VISIT (OUTPATIENT)
Dept: FAMILY MEDICINE | Facility: CLINIC | Age: 32
End: 2022-10-13
Payer: COMMERCIAL

## 2022-10-13 VITALS
RESPIRATION RATE: 18 BRPM | WEIGHT: 116.19 LBS | OXYGEN SATURATION: 99 % | HEIGHT: 63 IN | SYSTOLIC BLOOD PRESSURE: 106 MMHG | DIASTOLIC BLOOD PRESSURE: 64 MMHG | BODY MASS INDEX: 20.59 KG/M2 | HEART RATE: 97 BPM

## 2022-10-13 DIAGNOSIS — R63.4 WEIGHT LOSS: ICD-10-CM

## 2022-10-13 DIAGNOSIS — R63.0 LOSS OF APPETITE: ICD-10-CM

## 2022-10-13 DIAGNOSIS — R19.7 INTERMITTENT DIARRHEA: ICD-10-CM

## 2022-10-13 DIAGNOSIS — Z71.85 VACCINE COUNSELING: ICD-10-CM

## 2022-10-13 DIAGNOSIS — F90.9 ATTENTION DEFICIT DISORDER WITH HYPERACTIVITY: Primary | ICD-10-CM

## 2022-10-13 DIAGNOSIS — Z23 NEEDS FLU SHOT: ICD-10-CM

## 2022-10-13 DIAGNOSIS — R10.84 GENERALIZED ABDOMINAL PAIN: ICD-10-CM

## 2022-10-13 DIAGNOSIS — F41.9 ANXIETY: ICD-10-CM

## 2022-10-13 PROCEDURE — 3074F SYST BP LT 130 MM HG: CPT | Mod: CPTII,S$GLB,, | Performed by: STUDENT IN AN ORGANIZED HEALTH CARE EDUCATION/TRAINING PROGRAM

## 2022-10-13 PROCEDURE — 1159F PR MEDICATION LIST DOCUMENTED IN MEDICAL RECORD: ICD-10-PCS | Mod: CPTII,S$GLB,, | Performed by: STUDENT IN AN ORGANIZED HEALTH CARE EDUCATION/TRAINING PROGRAM

## 2022-10-13 PROCEDURE — 99214 OFFICE O/P EST MOD 30 MIN: CPT | Mod: S$GLB,,, | Performed by: STUDENT IN AN ORGANIZED HEALTH CARE EDUCATION/TRAINING PROGRAM

## 2022-10-13 PROCEDURE — 99214 PR OFFICE/OUTPT VISIT, EST, LEVL IV, 30-39 MIN: ICD-10-PCS | Mod: S$GLB,,, | Performed by: STUDENT IN AN ORGANIZED HEALTH CARE EDUCATION/TRAINING PROGRAM

## 2022-10-13 PROCEDURE — 3074F PR MOST RECENT SYSTOLIC BLOOD PRESSURE < 130 MM HG: ICD-10-PCS | Mod: CPTII,S$GLB,, | Performed by: STUDENT IN AN ORGANIZED HEALTH CARE EDUCATION/TRAINING PROGRAM

## 2022-10-13 PROCEDURE — 3078F PR MOST RECENT DIASTOLIC BLOOD PRESSURE < 80 MM HG: ICD-10-PCS | Mod: CPTII,S$GLB,, | Performed by: STUDENT IN AN ORGANIZED HEALTH CARE EDUCATION/TRAINING PROGRAM

## 2022-10-13 PROCEDURE — 1160F RVW MEDS BY RX/DR IN RCRD: CPT | Mod: CPTII,S$GLB,, | Performed by: STUDENT IN AN ORGANIZED HEALTH CARE EDUCATION/TRAINING PROGRAM

## 2022-10-13 PROCEDURE — 1160F PR REVIEW ALL MEDS BY PRESCRIBER/CLIN PHARMACIST DOCUMENTED: ICD-10-PCS | Mod: CPTII,S$GLB,, | Performed by: STUDENT IN AN ORGANIZED HEALTH CARE EDUCATION/TRAINING PROGRAM

## 2022-10-13 PROCEDURE — 99999 PR PBB SHADOW E&M-EST. PATIENT-LVL IV: CPT | Mod: PBBFAC,,, | Performed by: STUDENT IN AN ORGANIZED HEALTH CARE EDUCATION/TRAINING PROGRAM

## 2022-10-13 PROCEDURE — 3078F DIAST BP <80 MM HG: CPT | Mod: CPTII,S$GLB,, | Performed by: STUDENT IN AN ORGANIZED HEALTH CARE EDUCATION/TRAINING PROGRAM

## 2022-10-13 PROCEDURE — 1159F MED LIST DOCD IN RCRD: CPT | Mod: CPTII,S$GLB,, | Performed by: STUDENT IN AN ORGANIZED HEALTH CARE EDUCATION/TRAINING PROGRAM

## 2022-10-13 PROCEDURE — 99999 PR PBB SHADOW E&M-EST. PATIENT-LVL IV: ICD-10-PCS | Mod: PBBFAC,,, | Performed by: STUDENT IN AN ORGANIZED HEALTH CARE EDUCATION/TRAINING PROGRAM

## 2022-10-13 RX ORDER — LISDEXAMFETAMINE DIMESYLATE CAPSULES 20 MG/1
20 CAPSULE ORAL EVERY MORNING
Qty: 30 CAPSULE | Refills: 0 | Status: SHIPPED | OUTPATIENT
Start: 2022-12-12 | End: 2022-11-17

## 2022-10-13 RX ORDER — NAPROXEN 500 MG/1
500 TABLET ORAL 2 TIMES DAILY WITH MEALS
Qty: 30 TABLET | Refills: 0 | Status: SHIPPED | OUTPATIENT
Start: 2022-10-13 | End: 2022-10-28

## 2022-10-13 RX ORDER — LISDEXAMFETAMINE DIMESYLATE CAPSULES 20 MG/1
20 CAPSULE ORAL EVERY MORNING
Qty: 30 CAPSULE | Refills: 0 | Status: SHIPPED | OUTPATIENT
Start: 2022-11-12 | End: 2022-11-17

## 2022-10-13 RX ORDER — DICYCLOMINE HYDROCHLORIDE 20 MG/1
20 TABLET ORAL EVERY 6 HOURS
COMMUNITY
End: 2023-09-12

## 2022-10-13 RX ORDER — SERTRALINE HYDROCHLORIDE 50 MG/1
50 TABLET, FILM COATED ORAL DAILY
Qty: 30 TABLET | Refills: 0 | Status: SHIPPED | OUTPATIENT
Start: 2022-10-13 | End: 2022-11-13 | Stop reason: SDUPTHER

## 2022-10-13 RX ORDER — LISDEXAMFETAMINE DIMESYLATE CAPSULES 20 MG/1
20 CAPSULE ORAL EVERY MORNING
Qty: 30 CAPSULE | Refills: 0 | Status: SHIPPED | OUTPATIENT
Start: 2022-10-13 | End: 2022-11-13 | Stop reason: SDUPTHER

## 2022-10-13 NOTE — PROGRESS NOTES
Subjective:      Patient ID: Linda Sherwood is a 32 y.o. female    Chief Complaint   Patient presents with    Follow-up     HPI  32 y.o. female with a PMHx as documented below presents to clinic today for the following:  - ADHD: Pt doing well on the current medication dose/regimen. Reports medication helps with concentration and attention. Denies side effects on the current dose of medication including sleep disturbance, weight loss, significant appetite suppression, anxiety, emotional lability, agitation, and palpitations. PDMP reviewed and no aberrant or unexpected prescriptions. Last filled 10/13/2022. Pt happy with current dose and wishes to continue on current dose.  - Anxiety: Started on Zoloft 25 mg daily at last appointment - tolerating well without side effects. Reports improvement in mood overall but would like to try next highest dose.     GI Symptoms:  Patient reports several year history of intermittent, severe, cramping abdominal pain associated with intermittent diarrhea.  Patient reports symptoms often triggered by eating.  Patient states that she has tried to keep food diaries to identify food triggers, but she cannot identify a reliable pattern.  GI workup of out state included EGD and colonoscopy - results showed suspected esophageal candidiasis, evidence of diverticula, inflammatory patches, and multiple polyps. Biopsies sent to path - not on record in our system. Pt reports taking medication for treatment of esophageal candidiasis as prescribed by previous GI physician. Pt reports that she was never diagnosed with anything specific but told to take Bentyl as needed, which has not adequately helped her symptoms. Pt reports symptoms are getting worse and she has been having weight loss due to poor appetite and more frequent diarrhea. Of note, pt reports her mother having similar symptoms and her son having similar symptoms.     Review of Systems   Constitutional:  Positive for weight loss.    HENT:  Negative for hearing loss.    Eyes:  Negative for discharge.   Respiratory:  Negative for wheezing.    Cardiovascular:  Negative for chest pain and palpitations.   Gastrointestinal:  Positive for abdominal pain and diarrhea. Negative for blood in stool, constipation and vomiting.   Genitourinary:  Negative for dysuria and hematuria.   Musculoskeletal:  Negative for neck pain.   Neurological:  Negative for weakness and headaches.   Endo/Heme/Allergies:  Negative for polydipsia.   Psychiatric/Behavioral:  The patient is nervous/anxious.      Past Medical History:   Diagnosis Date    Anxiety     Attention deficit disorder with hyperactivity 01/15/2018    Chronic bilateral low back pain without sciatica 3/22/2018    Depression     GERD (gastroesophageal reflux disease) 4/12/2018     Objective:      Vitals:    10/13/22 1501   BP: 106/64   Pulse: 97   Resp: 18     Physical Exam  Vitals reviewed.   Constitutional:       General: She is not in acute distress.  HENT:      Head: Normocephalic and atraumatic.   Cardiovascular:      Rate and Rhythm: Normal rate.   Pulmonary:      Effort: Pulmonary effort is normal. No respiratory distress.   Neurological:      General: No focal deficit present.      Mental Status: She is alert and oriented to person, place, and time. Mental status is at baseline.      Assessment:       1. Attention deficit disorder with hyperactivity    2. Anxiety    3. Intermittent diarrhea    4. Generalized abdominal pain    5. Weight loss    6. Loss of appetite    7. Needs flu shot    8. Vaccine counseling      Plan:       Linda was seen today for follow-up.    Diagnoses and all orders for this visit:    Attention deficit disorder with hyperactivity  -     lisdexamfetamine (VYVANSE) 20 MG capsule; Take 1 capsule (20 mg total) by mouth every morning.  -     lisdexamfetamine (VYVANSE) 20 MG capsule; Take 1 capsule (20 mg total) by mouth every morning.  -     lisdexamfetamine (VYVANSE) 20 MG capsule;  Take 1 capsule (20 mg total) by mouth every morning.    Anxiety  -     sertraline (ZOLOFT) 50 MG tablet; Take 1 tablet (50 mg total) by mouth once daily.    Intermittent diarrhea  -     Ambulatory referral/consult to Gastroenterology; Future  -     H. pylori antigen, stool; Future  -     Pancreatic elastase, fecal; Future  -     Fecal fat, qualitative; Future  -     Occult blood x 1, stool; Future  -     WBC, Stool; Future  -     Rotavirus antigen, stool; Future  -     Adenovirus Molecular Detection, PCR, Non-Blood Stool; Future  -     Calprotectin, Stool; Future  -     Giardia / Cryptosporidum, EIA; Future  -     Stool Exam-Ova,Cysts,Parasites; Future  -     Clostridium difficile EIA; Future  -     Stool culture; Future    Generalized abdominal pain  -     Ambulatory referral/consult to Gastroenterology; Future  -     H. pylori antigen, stool; Future  -     Pancreatic elastase, fecal; Future  -     Fecal fat, qualitative; Future  -     Occult blood x 1, stool; Future  -     WBC, Stool; Future  -     Rotavirus antigen, stool; Future  -     Adenovirus Molecular Detection, PCR, Non-Blood Stool; Future  -     Calprotectin, Stool; Future  -     Giardia / Cryptosporidum, EIA; Future  -     Stool Exam-Ova,Cysts,Parasites; Future  -     Clostridium difficile EIA; Future  -     Stool culture; Future  -     naproxen (NAPROSYN) 500 MG tablet; Take 1 tablet (500 mg total) by mouth 2 (two) times daily with meals. for 30 doses    Weight loss  -     Ambulatory referral/consult to Gastroenterology; Future  -     H. pylori antigen, stool; Future  -     Pancreatic elastase, fecal; Future  -     Fecal fat, qualitative; Future  -     Occult blood x 1, stool; Future  -     WBC, Stool; Future  -     Rotavirus antigen, stool; Future  -     Adenovirus Molecular Detection, PCR, Non-Blood Stool; Future  -     Calprotectin, Stool; Future  -     Giardia / Cryptosporidum, EIA; Future  -     Stool Exam-Ova,Cysts,Parasites; Future  -      Clostridium difficile EIA; Future  -     Stool culture; Future    Loss of appetite  -     Ambulatory referral/consult to Gastroenterology; Future  -     H. pylori antigen, stool; Future  -     Pancreatic elastase, fecal; Future  -     Fecal fat, qualitative; Future  -     Occult blood x 1, stool; Future  -     WBC, Stool; Future  -     Rotavirus antigen, stool; Future  -     Adenovirus Molecular Detection, PCR, Non-Blood Stool; Future  -     Calprotectin, Stool; Future  -     Giardia / Cryptosporidum, EIA; Future  -     Stool Exam-Ova,Cysts,Parasites; Future  -     Clostridium difficile EIA; Future    Needs flu shot    Vaccine counseling  Flu shot administered today. Due for second hep B shot - awaiting order to arrive. Will call patient once back in stock.       Follow up in about 4 weeks (around 11/10/2022), or if symptoms worsen or fail to improve.    Betsy Goodrich MD  [facility]  Office: [phone]   Fax: [phone]  10/13/2022    Disclaimer: This note was partly generated using dictation software which may occasionally result in transcription errors.

## 2022-10-19 ENCOUNTER — LAB VISIT (OUTPATIENT)
Dept: LAB | Facility: HOSPITAL | Age: 32
End: 2022-10-19
Attending: STUDENT IN AN ORGANIZED HEALTH CARE EDUCATION/TRAINING PROGRAM
Payer: COMMERCIAL

## 2022-10-19 DIAGNOSIS — R63.4 WEIGHT LOSS: ICD-10-CM

## 2022-10-19 DIAGNOSIS — R63.0 LOSS OF APPETITE: ICD-10-CM

## 2022-10-19 DIAGNOSIS — R19.7 INTERMITTENT DIARRHEA: ICD-10-CM

## 2022-10-19 DIAGNOSIS — R10.84 GENERALIZED ABDOMINAL PAIN: ICD-10-CM

## 2022-10-19 PROCEDURE — 87427 SHIGA-LIKE TOXIN AG IA: CPT | Performed by: STUDENT IN AN ORGANIZED HEALTH CARE EDUCATION/TRAINING PROGRAM

## 2022-10-19 PROCEDURE — 87329 GIARDIA AG IA: CPT | Performed by: STUDENT IN AN ORGANIZED HEALTH CARE EDUCATION/TRAINING PROGRAM

## 2022-10-19 PROCEDURE — 82705 FATS/LIPIDS FECES QUAL: CPT | Performed by: STUDENT IN AN ORGANIZED HEALTH CARE EDUCATION/TRAINING PROGRAM

## 2022-10-19 PROCEDURE — 87798 DETECT AGENT NOS DNA AMP: CPT | Performed by: STUDENT IN AN ORGANIZED HEALTH CARE EDUCATION/TRAINING PROGRAM

## 2022-10-19 PROCEDURE — 89055 LEUKOCYTE ASSESSMENT FECAL: CPT | Performed by: STUDENT IN AN ORGANIZED HEALTH CARE EDUCATION/TRAINING PROGRAM

## 2022-10-19 PROCEDURE — 82272 OCCULT BLD FECES 1-3 TESTS: CPT | Performed by: STUDENT IN AN ORGANIZED HEALTH CARE EDUCATION/TRAINING PROGRAM

## 2022-10-19 PROCEDURE — 87209 SMEAR COMPLEX STAIN: CPT | Performed by: STUDENT IN AN ORGANIZED HEALTH CARE EDUCATION/TRAINING PROGRAM

## 2022-10-19 PROCEDURE — 87045 FECES CULTURE AEROBIC BACT: CPT | Performed by: STUDENT IN AN ORGANIZED HEALTH CARE EDUCATION/TRAINING PROGRAM

## 2022-10-19 PROCEDURE — 83993 ASSAY FOR CALPROTECTIN FECAL: CPT | Performed by: STUDENT IN AN ORGANIZED HEALTH CARE EDUCATION/TRAINING PROGRAM

## 2022-10-19 PROCEDURE — 87425 ROTAVIRUS AG IA: CPT | Performed by: STUDENT IN AN ORGANIZED HEALTH CARE EDUCATION/TRAINING PROGRAM

## 2022-10-19 PROCEDURE — 87046 STOOL CULTR AEROBIC BACT EA: CPT | Mod: 59 | Performed by: STUDENT IN AN ORGANIZED HEALTH CARE EDUCATION/TRAINING PROGRAM

## 2022-10-19 PROCEDURE — 87338 HPYLORI STOOL AG IA: CPT | Performed by: STUDENT IN AN ORGANIZED HEALTH CARE EDUCATION/TRAINING PROGRAM

## 2022-10-19 PROCEDURE — 82656 EL-1 FECAL QUAL/SEMIQ: CPT | Performed by: STUDENT IN AN ORGANIZED HEALTH CARE EDUCATION/TRAINING PROGRAM

## 2022-10-20 LAB
CRYPTOSP AG STL QL IA: NEGATIVE
E COLI SXT1 STL QL IA: NEGATIVE
E COLI SXT2 STL QL IA: NEGATIVE
G LAMBLIA AG STL QL IA: NEGATIVE
OB PNL STL: NEGATIVE
RV AG STL QL IA.RAPID: NEGATIVE
WBC #/AREA STL HPF: NORMAL /[HPF]

## 2022-10-21 ENCOUNTER — TELEPHONE (OUTPATIENT)
Dept: GASTROENTEROLOGY | Facility: CLINIC | Age: 32
End: 2022-10-21
Payer: COMMERCIAL

## 2022-10-21 ENCOUNTER — CLINICAL SUPPORT (OUTPATIENT)
Dept: FAMILY MEDICINE | Facility: CLINIC | Age: 32
End: 2022-10-21
Payer: COMMERCIAL

## 2022-10-21 ENCOUNTER — LAB VISIT (OUTPATIENT)
Dept: LAB | Facility: HOSPITAL | Age: 32
End: 2022-10-21
Payer: COMMERCIAL

## 2022-10-21 ENCOUNTER — OFFICE VISIT (OUTPATIENT)
Dept: GASTROENTEROLOGY | Facility: CLINIC | Age: 32
End: 2022-10-21
Payer: COMMERCIAL

## 2022-10-21 ENCOUNTER — TELEPHONE (OUTPATIENT)
Dept: SURGERY | Facility: CLINIC | Age: 32
End: 2022-10-21
Payer: COMMERCIAL

## 2022-10-21 VITALS — BODY MASS INDEX: 20.66 KG/M2 | HEIGHT: 63 IN | WEIGHT: 116.63 LBS

## 2022-10-21 DIAGNOSIS — R63.4 WEIGHT LOSS: ICD-10-CM

## 2022-10-21 DIAGNOSIS — R63.8 DECREASED ORAL INTAKE: ICD-10-CM

## 2022-10-21 DIAGNOSIS — R10.30 LOWER ABDOMINAL PAIN: ICD-10-CM

## 2022-10-21 DIAGNOSIS — R10.84 GENERALIZED ABDOMINAL PAIN: ICD-10-CM

## 2022-10-21 DIAGNOSIS — Z71.85 VACCINE COUNSELING: Primary | ICD-10-CM

## 2022-10-21 DIAGNOSIS — R11.0 NAUSEA: ICD-10-CM

## 2022-10-21 DIAGNOSIS — Z86.59 HISTORY OF ANXIETY: ICD-10-CM

## 2022-10-21 DIAGNOSIS — R10.84 GENERALIZED ABDOMINAL PAIN: Primary | ICD-10-CM

## 2022-10-21 DIAGNOSIS — K52.9 CHRONIC DIARRHEA: ICD-10-CM

## 2022-10-21 DIAGNOSIS — Z23 NEED FOR PROPHYLACTIC VACCINATION AGAINST HEPATITIS B VIRUS: ICD-10-CM

## 2022-10-21 DIAGNOSIS — Z87.19 HISTORY OF GASTRITIS: ICD-10-CM

## 2022-10-21 DIAGNOSIS — R19.5 YEAST IN STOOL: ICD-10-CM

## 2022-10-21 DIAGNOSIS — K52.9 CHRONIC DIARRHEA: Primary | ICD-10-CM

## 2022-10-21 DIAGNOSIS — Z86.59 HISTORY OF ADHD: ICD-10-CM

## 2022-10-21 LAB
ALBUMIN SERPL BCP-MCNC: 4.4 G/DL (ref 3.5–5.2)
ALP SERPL-CCNC: 69 U/L (ref 55–135)
ALT SERPL W/O P-5'-P-CCNC: 14 U/L (ref 10–44)
AST SERPL-CCNC: 16 U/L (ref 10–40)
B-HCG UR QL: NEGATIVE
BILIRUB DIRECT SERPL-MCNC: 0.1 MG/DL (ref 0.1–0.3)
BILIRUB SERPL-MCNC: 0.2 MG/DL (ref 0.1–1)
IGA SERPL-MCNC: 304 MG/DL (ref 40–350)
LIPASE SERPL-CCNC: 13 U/L (ref 4–60)
PROT SERPL-MCNC: 7.8 G/DL (ref 6–8.4)
TSH SERPL DL<=0.005 MIU/L-ACNC: 1.33 UIU/ML (ref 0.4–4)

## 2022-10-21 PROCEDURE — 36415 COLL VENOUS BLD VENIPUNCTURE: CPT | Mod: PO | Performed by: NURSE PRACTITIONER

## 2022-10-21 PROCEDURE — 85027 COMPLETE CBC AUTOMATED: CPT | Performed by: NURSE PRACTITIONER

## 2022-10-21 PROCEDURE — 99204 OFFICE O/P NEW MOD 45 MIN: CPT | Mod: S$GLB,,, | Performed by: NURSE PRACTITIONER

## 2022-10-21 PROCEDURE — 90739 HEPATITIS B (RECOMBINANT) ADJUVANTED, 2 DOSE: ICD-10-PCS | Mod: S$GLB,,, | Performed by: STUDENT IN AN ORGANIZED HEALTH CARE EDUCATION/TRAINING PROGRAM

## 2022-10-21 PROCEDURE — 1159F PR MEDICATION LIST DOCUMENTED IN MEDICAL RECORD: ICD-10-PCS | Mod: CPTII,S$GLB,, | Performed by: NURSE PRACTITIONER

## 2022-10-21 PROCEDURE — 99999 PR PBB SHADOW E&M-EST. PATIENT-LVL I: CPT | Mod: PBBFAC,,,

## 2022-10-21 PROCEDURE — 80076 HEPATIC FUNCTION PANEL: CPT | Performed by: NURSE PRACTITIONER

## 2022-10-21 PROCEDURE — 1160F RVW MEDS BY RX/DR IN RCRD: CPT | Mod: CPTII,S$GLB,, | Performed by: NURSE PRACTITIONER

## 2022-10-21 PROCEDURE — 1160F PR REVIEW ALL MEDS BY PRESCRIBER/CLIN PHARMACIST DOCUMENTED: ICD-10-PCS | Mod: CPTII,S$GLB,, | Performed by: NURSE PRACTITIONER

## 2022-10-21 PROCEDURE — 1159F MED LIST DOCD IN RCRD: CPT | Mod: CPTII,S$GLB,, | Performed by: NURSE PRACTITIONER

## 2022-10-21 PROCEDURE — 84443 ASSAY THYROID STIM HORMONE: CPT | Performed by: NURSE PRACTITIONER

## 2022-10-21 PROCEDURE — 99999 PR PBB SHADOW E&M-EST. PATIENT-LVL I: ICD-10-PCS | Mod: PBBFAC,,,

## 2022-10-21 PROCEDURE — 99204 PR OFFICE/OUTPT VISIT, NEW, LEVL IV, 45-59 MIN: ICD-10-PCS | Mod: S$GLB,,, | Performed by: NURSE PRACTITIONER

## 2022-10-21 PROCEDURE — 81025 URINE PREGNANCY TEST: CPT | Mod: PO | Performed by: NURSE PRACTITIONER

## 2022-10-21 PROCEDURE — 86364 TISS TRNSGLTMNASE EA IG CLAS: CPT | Performed by: NURSE PRACTITIONER

## 2022-10-21 PROCEDURE — 99999 PR PBB SHADOW E&M-EST. PATIENT-LVL IV: CPT | Mod: PBBFAC,,, | Performed by: NURSE PRACTITIONER

## 2022-10-21 PROCEDURE — 90739 HEPB VACC 2/4 DOSE ADULT IM: CPT | Mod: S$GLB,,, | Performed by: STUDENT IN AN ORGANIZED HEALTH CARE EDUCATION/TRAINING PROGRAM

## 2022-10-21 PROCEDURE — 83690 ASSAY OF LIPASE: CPT | Performed by: NURSE PRACTITIONER

## 2022-10-21 PROCEDURE — 82784 ASSAY IGA/IGD/IGG/IGM EACH: CPT | Performed by: NURSE PRACTITIONER

## 2022-10-21 PROCEDURE — 90471 HEPATITIS B (RECOMBINANT) ADJUVANTED, 2 DOSE: ICD-10-PCS | Mod: S$GLB,,, | Performed by: STUDENT IN AN ORGANIZED HEALTH CARE EDUCATION/TRAINING PROGRAM

## 2022-10-21 PROCEDURE — 99999 PR PBB SHADOW E&M-EST. PATIENT-LVL IV: ICD-10-PCS | Mod: PBBFAC,,, | Performed by: NURSE PRACTITIONER

## 2022-10-21 PROCEDURE — 90471 IMMUNIZATION ADMIN: CPT | Mod: S$GLB,,, | Performed by: STUDENT IN AN ORGANIZED HEALTH CARE EDUCATION/TRAINING PROGRAM

## 2022-10-21 RX ORDER — OMEPRAZOLE 40 MG/1
40 CAPSULE, DELAYED RELEASE ORAL
Qty: 30 CAPSULE | Refills: 2 | Status: SHIPPED | OUTPATIENT
Start: 2022-10-21 | End: 2023-01-23

## 2022-10-21 RX ORDER — NYSTATIN 500000 [USP'U]/1
500000 TABLET, COATED ORAL EVERY 8 HOURS
Qty: 21 TABLET | Refills: 0 | Status: SHIPPED | OUTPATIENT
Start: 2022-10-21 | End: 2022-10-28

## 2022-10-21 NOTE — PATIENT INSTRUCTIONS
Uncertain Causes of Diarrhea (Adult)    Diarrhea is when stools are loose and watery. This can be caused by:  Viral infections  Bacterial infections  Food poisoning  Parasites  Irritable bowel syndrome (IBS)  Inflammatory bowel diseases such as ulcerative colitis, Crohn's disease, and celiac disease  Food intolerance, such as to lactose, the sugar found in milk and milk products  Reaction to medicines like antibiotics, laxatives, cancer drugs, and antacids  Along with diarrhea, you may also have:  Abdominal pain and cramping  Nausea and vomiting  Loss of bowel control  Fever and chills  Bloody stools  In some cases, antibiotics may help to treat diarrhea. You may have a stool sample test. This is done to see what is causing your diarrhea, and if antibiotics will help treat it. The results of a stool sample test may take up to 2 days. The healthcare provider may not give you antibiotics until he or she has the stool test results.  Diarrhea can cause dehydration. This is the loss of too much water and other fluids from the body. When this occurs, body fluid must be replaced. This can be done with oral rehydration solutions. Oral rehydration solutions are available at drugstores and grocery stores without a prescription.  Home care  Follow all instructions given by your healthcare provider. Rest at home for the next 24 hours, or until you feel better. Avoid caffeine, tobacco, and alcohol. These can make diarrhea, cramping, and pain worse.  If taking medicines:  Dont take over-the-counter diarrhea or nausea medicines unless your healthcare provider tells you to.  You may use acetaminophen or NSAID medicines like ibuprofen or naproxen to reduce pain and fever. Dont use these if you have chronic liver or kidney disease, or ever had a stomach ulcer or gastrointestinal bleeding. Don't use NSAID medicines if you are already taking one for another condition (like arthritis) or are on daily aspirin therapy (such as for heart  disease or after a stroke). Talk with your healthcare provider first.  If antibiotics were prescribed, be sure you take them until they are finished. Dont stop taking them even when you feel better. Antibiotics must be taken as a full course.  To prevent the spread of illness:  Remember that washing with soap and water and using alcohol-based  is the best way to prevent the spread of infection.  Clean the toilet after each use.  Wash your hands before eating.  Wash your hands before and after preparing food. Keep in mind that people with diarrhea or vomiting should not prepare food for others.  Wash your hands after using cutting boards, countertops, and knives that have been in contact with raw foods.  Wash and then peel fruits and vegetables.  Keep uncooked meats away from cooked and ready-to-eat foods.  Use a food thermometer when cooking. Cook poultry to at least 165°F (74°C). Cook ground meat (beef, veal, pork, lamb) to at least 160°F (71°C). Cook fresh beef, veal, lamb, and pork to at least 145°F (63°C).  Dont eat raw or undercooked eggs (poached or abdulaziz side up), poultry, meat, or unpasteurized milk and juices.  Food and drinks  The main goal while treating vomiting or diarrhea is to prevent dehydration. This is done by taking small amounts of liquids often.  Keep in mind that liquids are more important than food right now.  Drink only small amounts of liquids at a time.  Dont force yourself to eat, especially if you are having cramping, vomiting, or diarrhea. Dont eat large amounts at a time, even if you are hungry.  If you eat, avoid fatty, greasy, spicy, or fried foods.  Dont eat dairy foods or drink milk if you have diarrhea. These can make diarrhea worse.  During the first 24 hours you can try:  Oral rehydration solutions. Do not use sports drinks. They have too much sugar and not enough electrolytes.  Soft drinks without caffeine  Ginger ale  Water (plain or flavored)  Decaf tea or  coffee  Clear broth, consommé, or bouillon  Gelatin, popsicles, or frozen fruit juice bars  The second 24 hours, if you are feeling better, you can add:  Hot cereal, plain toast, bread, rolls, or crackers  Plain noodles, rice, mashed potatoes, chicken noodle soup, or rice soup  Unsweetened canned fruit (no pineapple)  Bananas  As you recover:  Limit fat intake to less than 15 grams per day. Dont eat margarine, butter, oils, mayonnaise, sauces, gravies, fried foods, peanut butter, meat, poultry, or fish.  Limit fiber. Dont eat raw or cooked vegetables, fresh fruits except bananas, or bran cereals.  Limit caffeine and chocolate.  Limit dairy.  Dont use spices or seasonings except salt.  Go back to your normal diet over time, as you feel better and your symptoms improve.  If the symptoms come back, go back to a simple diet or clear liquids.  Follow-up care  Follow up with your healthcare provider, or as advised. If a stool sample was taken or cultures were done, call the healthcare provider for the results as instructed.  Call 911  Call 911 if you have any of these symptoms:  Trouble breathing  Confusion  Extreme drowsiness or trouble walking  Loss of consciousness  Rapid heart rate  Chest pain  Stiff neck  Seizure  When to seek medical advice  Call your healthcare provider right away if any of these occur:  Abdominal pain that gets worse  Constant lower right abdominal pain  Continued vomiting and inability to keep liquids down  Diarrhea more than 5 times a day  Blood in vomit or stool  Dark urine or no urine for 8 hours, dry mouth and tongue, tiredness, weakness, or dizziness  Drowsiness  New rash  You dont get better in 2 to 3 days  Fever of 100.4°F (38°C) or higher that doesnt get lower with medicine  Date Last Reviewed: 1/3/2016  © 3698-4560 [employer]. [address] [place] [address], [place] [address]. All rights reserved. This information is not intended as a substitute for professional medical care.  Always follow your healthcare professional's instructions.           Abdominal Pain    Abdominal pain is pain in the stomach or belly area. Everyone has this pain from time to time. In many cases it goes away on its own. But abdominal pain can sometimes be due to a serious problem, such as appendicitis. So its important to know when to seek help.  Causes of abdominal pain  There are many possible causes of abdominal pain. Common causes in adults include:  Constipation, diarrhea, or gas  Stomach acid flowing back up into the esophagus (acid reflux or heartburn)  Severe acid reflux, called GERD (gastroesophageal reflux disease)  A sore in the lining of the stomach or small intestine (peptic ulcer)  Inflammation of the gallbladder, liver, or pancreas  Gallstones or kidney stones  Appendicitis   Intestinal blockage   An internal organ pushing through a muscle or other tissue (hernia)  Urinary tract infections  In women, menstrual cramps, fibroids, or endometriosis  Inflammation or infection of the intestines  Diagnosing the cause of abdominal pain  Your healthcare provider will do a physical exam help find the cause of your pain. If needed, tests will be ordered. Belly pain has many possible causes. So it can be hard to find the reason for your pain. Giving details about your pain can help. Tell your provider where and when you feel the pain, and what makes it better or worse. Also let your provider know if you have other symptoms such as:  Fever  Tiredness  Upset stomach (nausea)  Vomiting  Changes in bathroom habits  Treating abdominal pain  Some causes of pain need emergency medical treatment right away. These include appendicitis or a bowel blockage. Other problems can be treated with rest, fluids, or medicines. Your healthcare provider can give you specific instructions for treatment or self-care based on what is causing your pain.  If you have vomiting or diarrhea, sip water or other clear fluids. When you are  ready to eat solid foods again, start with small amounts of easy-to-digest, low-fat foods. These include apple sauce, toast, or crackers.   When to seek medical care  Call 911 or go to the hospital right away if you:  Cant pass stool and are vomiting  Are vomiting blood or have bloody diarrhea or black, tarry diarrhea  Have chest, neck, or shoulder pain  Feel like you might pass out  Have pain in your shoulder blades with nausea  Have sudden, severe belly pain  Have new, severe pain unlike any you have felt before  Have a belly that is rigid, hard, and tender to touch  Call your healthcare provider if you have:  Pain for more than 5 days  Bloating for more than 2 days  Diarrhea for more than 5 days  A fever of 100.4°F (38.0°C) or higher, or as directed by your provider  Pain that gets worse  Weight loss for no reason  Continued lack of appetite  Blood in your stool  How to prevent abdominal pain  Here are some tips to help prevent abdominal pain:  Eat smaller amounts of food at one time.  Avoid greasy, fried, or other high-fat foods.  Avoid foods that give you gas.  Exercise regularly.  Drink plenty of fluids.  To help prevent GERD symptoms:  Quit smoking.  Reduce alcohol and certain foods that increase stomach acid.  Avoid aspirin and over-the-counter pain and fever medicines (NSAIDS or nonsteroidal anti-inflammatory drugs), if possible  Lose extra weight.  Finish eating at least 2 hours before you go to bed or lie down.  Raise the head of your bed.  Date Last Reviewed: 7/1/2016  © 4904-6745 [employer]. [address] [place] [address], [place] [address]. All rights reserved. This information is not intended as a substitute for professional medical care. Always follow your healthcare professional's instructions.

## 2022-10-21 NOTE — TELEPHONE ENCOUNTER
----- Message from OTONIEL Izaguirre sent at 10/21/2022 12:35 PM CDT -----  Please call to inform & review the results with the patient- Lab result showed negative urine pregnancy test.   Continue with previous recommendations.  Thanks,  Abimbola FREDERICK-C

## 2022-10-21 NOTE — PROGRESS NOTES
"Subjective:       Patient ID: Linda Sherwood is a 32 y.o. female Body mass index is 20.66 kg/m².    Chief Complaint: Diarrhea (Diarrhea is consistent to number 6/7 on Stool Chart. Has not taken medication for diarrhea. ) and Abdominal Pain (Sometimes takes naproxen for pain, relief. )    This patient is new to me.  Referring Provider: Dr. Betsy Goodrich for intermittent diarrhea, generalized abdominal pain, weight loss, and loss of appetite.     Patient reports she used to see Dr. Garay and wants to establish her GI care with us. Patient did not bring prior medical records to visit for review.    10/13/2022 visit note with Dr. Goodrich: "GI Symptoms:  Patient reports several year history of intermittent, severe, cramping abdominal pain associated with intermittent diarrhea.  Patient reports symptoms often triggered by eating.  Patient states that she has tried to keep food diaries to identify food triggers, but she cannot identify a reliable pattern.  GI workup of out state included EGD and colonoscopy - results showed suspected esophageal candidiasis, evidence of diverticula, inflammatory patches, and multiple polyps. Biopsies sent to path - not on record in our system. Pt reports taking medication for treatment of esophageal candidiasis as prescribed by previous GI physician. Pt reports that she was never diagnosed with anything specific but told to take Bentyl as needed, which has not adequately helped her symptoms. Pt reports symptoms are getting worse and she has been having weight loss due to poor appetite and more frequent diarrhea. Of note, pt reports her mother having similar symptoms and her son having similar symptoms."    GI Problem  The primary symptoms include weight loss (weight loss of 6 lbs noted on chart weights since starting vyvanse on 9/13/2022 and zoloft (started 9/13/2022 & dose increased on 10/13/2022)), abdominal pain, nausea (occasional) and diarrhea. Primary symptoms do not " include fever, fatigue, vomiting, melena, hematemesis, hematochezia or dysuria.   The abdominal pain began more than 2 days ago (started several years ago). The abdominal pain has been unchanged since its onset. The abdominal pain is located in the periumbilical region, RLQ, LLQ and suprapubic region (occurs everytime she eats or in the afternoon, lasts for hours-days). The abdominal pain does not radiate. The severity of the abdominal pain is 0/10 (currently). Relieved by: heating pad, motrin PRN helps- taken rarely; worse with eating.   The diarrhea began more than 1 week ago (started several years ago). The diarrhea is watery. Daily occurrences: 1-2 times a day sometimes but can have 8-10 times a day (having it more frequently lately) Risk factors for illness producing diarrhea include new medications (vyvanse & zoloft).   The illness does not include chills, dysphagia, odynophagia or constipation. Associated symptoms comments: Not currently taking bentyl . Significant associated medical issues include diverticulitis. Associated medical issues do not include GERD (denies any recently) or PUD.     Review of Systems   Constitutional:  Positive for weight loss (weight loss of 6 lbs noted on chart weights since starting vyvanse on 9/13/2022 and zoloft (started 9/13/2022 & dose increased on 10/13/2022)). Negative for appetite change (reports she is hungry and denies decreased appetite but chosing not to eat due to pain with eating), chills, fatigue and fever.   HENT:  Negative for sore throat and trouble swallowing.    Respiratory:  Negative for cough, choking and shortness of breath.    Cardiovascular:  Negative for chest pain.   Gastrointestinal:  Positive for abdominal pain, diarrhea and nausea (occasional). Negative for anal bleeding, blood in stool, constipation, dysphagia, hematemesis, hematochezia, melena, rectal pain and vomiting.   Genitourinary:  Negative for difficulty urinating, dysuria and flank pain.    Neurological:  Negative for weakness.       Patient's last menstrual period was 10/19/2022.  Past Medical History:   Diagnosis Date    Anxiety     Attention deficit disorder with hyperactivity 01/15/2018    Chronic bilateral low back pain without sciatica 03/22/2018    Colon polyp     Depression     Diverticulitis     Diverticulosis     Gastritis     GERD (gastroesophageal reflux disease) 04/12/2018     Past Surgical History:   Procedure Laterality Date    APPENDECTOMY      AUGMENTATION OF BREAST  2009    COLONOSCOPY  09/28/2021    in media/procedures, Dr. Garay, biopsy report not available    EGD - EXTERNAL RESULT  09/28/2021    in media/procedures, Dr. 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     Family History   Problem Relation Age of Onset    Ovarian cancer Sister 25    Heart disease Paternal Grandfather     Colon cancer Neg Hx     Crohn's disease Neg Hx     Esophageal cancer Neg Hx     Stomach cancer Neg Hx     Ulcerative colitis Neg Hx     Celiac disease Neg Hx      Social History     Tobacco Use    Smoking status: Former    Smokeless tobacco: Never   Substance Use Topics    Alcohol use: No    Drug use: No     Wt Readings from Last 10 Encounters:   10/21/22 52.9 kg (116 lb 10 oz)   10/13/22 52.7 kg (116 lb 2.9 oz)   09/13/22 55.4 kg (122 lb 2.2 oz)   08/31/22 55.4 kg (122 lb 2.2 oz)   12/18/20 63.1 kg (139 lb 1.8 oz)   04/12/18 56.3 kg (124 lb 1.6 oz)   03/22/18 57.2 kg (126 lb)   03/07/18 57.6 kg (126 lb 15.8 oz)   03/09/17 60.8 kg (134 lb)   03/03/17 60.8 kg (134 lb 0.6 oz)     Had child in 8/2019 and reports had baby weight for awhile.    Lab Results   Component Value Date    WBC 6.52 03/12/2018    HGB 12.5 03/12/2018    HCT 36.3 (L) 03/12/2018    MCV 86 03/12/2018     03/12/2018     CMP  Sodium   Date Value Ref Range Status   03/22/2018 138 136 - 145 mmol/L Final     Potassium   Date Value Ref Range Status   03/22/2018 3.7 3.5 - 5.1 mmol/L Final      Chloride   Date Value Ref Range Status   03/22/2018 105 95 - 110 mmol/L Final     CO2   Date Value Ref Range Status   03/22/2018 27 23 - 29 mmol/L Final     Glucose   Date Value Ref Range Status   03/22/2018 92 70 - 110 mg/dL Final     BUN   Date Value Ref Range Status   03/22/2018 11 6 - 20 mg/dL Final     Creatinine   Date Value Ref Range Status   03/22/2018 0.8 0.5 - 1.4 mg/dL Final     Calcium   Date Value Ref Range Status   03/22/2018 9.5 8.7 - 10.5 mg/dL Final     Total Protein   Date Value Ref Range Status   03/22/2018 7.1 6.0 - 8.4 g/dL Final     Albumin   Date Value Ref Range Status   03/22/2018 4.0 3.5 - 5.2 g/dL Final     Total Bilirubin   Date Value Ref Range Status   03/22/2018 0.5 0.1 - 1.0 mg/dL Final     Comment:     For infants and newborns, interpretation of results should be based  on gestational age, weight and in agreement with clinical  observations.  Premature Infant recommended reference ranges:  Up to 24 hours.............<8.0 mg/dL  Up to 48 hours............<12.0 mg/dL  3-5 days..................<15.0 mg/dL  6-29 days.................<15.0 mg/dL       Alkaline Phosphatase   Date Value Ref Range Status   03/22/2018 65 55 - 135 U/L Final     AST   Date Value Ref Range Status   03/22/2018 14 10 - 40 U/L Final     ALT   Date Value Ref Range Status   03/22/2018 10 10 - 44 U/L Final     Anion Gap   Date Value Ref Range Status   03/22/2018 6 (L) 8 - 16 mmol/L Final     eGFR if    Date Value Ref Range Status   03/22/2018 >60.0 >60 mL/min/1.73 m^2 Final     eGFR if non    Date Value Ref Range Status   03/22/2018 >60.0 >60 mL/min/1.73 m^2 Final     Comment:     Calculation used to obtain the estimated glomerular filtration  rate (eGFR) is the CKD-EPI equation.        Lab Results   Component Value Date    TSH 1.450 12/18/2020     10/19/2022 stool studies ordered by PCP team (some are still processing; few budding yeast seen)    Reviewed prior medical records  including endoscopy history (see surgical history/procedures).    Objective:      Physical Exam  Vitals and nursing note reviewed.   Constitutional:       General: She is not in acute distress.     Appearance: Normal appearance. She is well-developed. She is not diaphoretic.   HENT:      Mouth/Throat:      Lips: Pink. No lesions.      Mouth: Mucous membranes are moist. No oral lesions.      Tongue: No lesions.      Pharynx: Oropharynx is clear. No pharyngeal swelling or posterior oropharyngeal erythema.   Eyes:      General: No scleral icterus.     Conjunctiva/sclera: Conjunctivae normal.   Pulmonary:      Effort: Pulmonary effort is normal. No respiratory distress.      Breath sounds: Normal breath sounds. No wheezing.   Abdominal:      General: Bowel sounds are normal. There is no distension or abdominal bruit.      Palpations: Abdomen is soft. Abdomen is not rigid. There is no mass.      Tenderness: There is no abdominal tenderness. There is no guarding or rebound. Negative signs include Harris's sign and McBurney's sign.   Skin:     General: Skin is warm and dry.      Coloration: Skin is not jaundiced or pale.      Findings: No erythema or rash.   Neurological:      Mental Status: She is alert and oriented to person, place, and time.   Psychiatric:         Behavior: Behavior normal.         Thought Content: Thought content normal.         Judgment: Judgment normal.       Assessment:       1. Chronic diarrhea    2. Yeast in stool    3. Generalized abdominal pain    4. Lower abdominal pain    5. Weight loss    6. Decreased oral intake    7. History of ADHD    8. History of anxiety    9. Nausea    10. History of gastritis       Plan:     Patient agreed to sign medical records release consent for us to obtain records from Dr. Garay    Chronic diarrhea  -     IgA; Future; Expected date: 10/21/2022  -     Tissue Transglutaminase, IgA; Future; Expected date: 10/21/2022  -     CT Abdomen Pelvis With Contrast; Future;  Expected date: 10/21/2022  -  START   nystatin (MYCOSTATIN) 500,000 unit Tab; Take 1 tablet (500,000 Units total) by mouth every 8 (eight) hours. for 7 days  Dispense: 21 tablet; Refill: 0  - CAN START BENTYL 20 MG EVERY 6 HOURS PRN AS PREVIOUSLY PRESCRIBED  - recommend increase fiber in diet, especially soluble fiber since this can help bulk up the stool consistency and may help to slow down how fast the stool goes through the colon and can prevent diarrhea; Recommend high fiber diet (20-30 grams of fiber daily)/OTC fiber supplements daily as directed, such as Benefiber or Metamucil.  - Possible colonoscopy pending results of testing and if symptoms persist    Yeast in stool  -  START   nystatin (MYCOSTATIN) 500,000 unit Tab; Take 1 tablet (500,000 Units total) by mouth every 8 (eight) hours. for 7 days  Dispense: 21 tablet; Refill: 0    Generalized abdominal pain  -     IgA; Future; Expected date: 10/21/2022  -     Tissue Transglutaminase, IgA; Future; Expected date: 10/21/2022  -     CT Abdomen Pelvis With Contrast; Future; Expected date: 10/21/2022  -     Pregnancy, urine rapid; Future; Expected date: 10/21/2022  -     CBC Without Differential; Future; Expected date: 10/21/2022  -     Lipase; Future; Expected date: 10/21/2022  -     Hepatic Function Panel; Future; Expected date: 10/21/2022  - avoid/minimize use of NSAIDs- since they can cause GI upset, bleeding and/or ulcers. If NSAID must be taken, recommend take with food.  - CAN START BENTYL 20 MG EVERY 6 HOURS PRN AS PREVIOUSLY PRESCRIBED    Lower abdominal pain  -     CT Abdomen Pelvis With Contrast; Future; Expected date: 10/21/2022  -     Pregnancy, urine rapid; Future; Expected date: 10/21/2022  -     CBC Without Differential; Future; Expected date: 10/21/2022  - CAN START BENTYL 20 MG EVERY 6 HOURS PRN AS PREVIOUSLY PRESCRIBED    Weight loss & Decreased oral intake  - discussed with patient that certain medications, such as VYVANSE & ZOLOFT, may be  contributing to symptoms. I recommend follow-up with provider who manages medication to discuss about possible alternative therapy, patient verbalized understanding  - encouraged PO intake and daily calorie counts to ensure adequate nutrition is taken in, recommend at least 2,000 calories a day  - recommend nutritional drinks, such as Boost, Ensure or Glucerna, to supplement nutrition needs  -     TSH; Future; Expected date: 10/21/2022  -     IgA; Future; Expected date: 10/21/2022  -     Tissue Transglutaminase, IgA; Future; Expected date: 10/21/2022  -     CT Abdomen Pelvis With Contrast; Future; Expected date: 10/21/2022  - Possible repeat EGD/colonoscopy pending results of testing and if symptoms persist    History of ADHD & History of anxiety  - discussed with patient that certain medications, such as VYVANSE & ZOLOFT, may be contributing to symptoms. I recommend follow-up with provider who manages medication to discuss about possible alternative therapy, patient verbalized understanding    Nausea  -     CT Abdomen Pelvis With Contrast; Future; Expected date: 10/21/2022  -  START   omeprazole (PRILOSEC) 40 MG capsule; Take 1 capsule (40 mg total) by mouth before breakfast.  Dispense: 30 capsule; Refill: 2  - Possible repeat EGD pending results of testing and if symptoms persist    History of gastritis  -  START   omeprazole (PRILOSEC) 40 MG capsule; Take 1 capsule (40 mg total) by mouth before breakfast.  Dispense: 30 capsule; Refill: 2  - avoid/minimize use of NSAIDs- since they can cause GI upset, bleeding and/or ulcers. If NSAID must be taken, recommend take with food.    Follow up in about 1 month (around 11/21/2022), or if symptoms worsen or fail to improve.      If no improvement in symptoms or symptoms worsen, call/follow-up at clinic or go to ER.        45 minutes of total time spent on the encounter, which includes face to face time and non-face to face time preparing to see the patient (e.g., review of  tests), Obtaining and/or reviewing separately obtained history, Documenting clinical information in the electronic or other health record, Independently interpreting results (not separately reported) and communicating results to the patient/family/caregiver, or Care coordination (not separately reported).

## 2022-10-21 NOTE — TELEPHONE ENCOUNTER
Called patient to let her know appointment with Dr. Fang will be cancelled this afternoon. She needs to see GI. No answer, left a message.

## 2022-10-22 LAB
BACTERIA STL CULT: NORMAL
ELASTASE 1, FECAL: >500 MCG/G
ERYTHROCYTE [DISTWIDTH] IN BLOOD BY AUTOMATED COUNT: 12.7 % (ref 11.5–14.5)
FAT STL QL: NORMAL
HADV DNA SERPL QL NAA+PROBE: NEGATIVE
HCT VFR BLD AUTO: 40.4 % (ref 37–48.5)
HGB BLD-MCNC: 13.2 G/DL (ref 12–16)
MCH RBC QN AUTO: 28.9 PG (ref 27–31)
MCHC RBC AUTO-ENTMCNC: 32.7 G/DL (ref 32–36)
MCV RBC AUTO: 89 FL (ref 82–98)
NEUTRAL FAT STL QL: NORMAL
PLATELET # BLD AUTO: 359 K/UL (ref 150–450)
PMV BLD AUTO: 11.3 FL (ref 9.2–12.9)
RBC # BLD AUTO: 4.56 M/UL (ref 4–5.4)
SPECIMEN SOURCE: NORMAL
WBC # BLD AUTO: 6.89 K/UL (ref 3.9–12.7)

## 2022-10-23 LAB — O+P STL MICRO: NORMAL

## 2022-10-25 LAB — TTG IGA SER-ACNC: 8 UNITS

## 2022-10-26 LAB
CALPROTECTIN STL-MCNT: <27.1 MCG/G
H PYLORI AG STL QL IA: NOT DETECTED
SPECIMEN SOURCE: NORMAL

## 2022-10-27 ENCOUNTER — PATIENT OUTREACH (OUTPATIENT)
Dept: ADMINISTRATIVE | Facility: HOSPITAL | Age: 32
End: 2022-10-27
Payer: COMMERCIAL

## 2022-10-27 ENCOUNTER — PATIENT MESSAGE (OUTPATIENT)
Dept: ADMINISTRATIVE | Facility: HOSPITAL | Age: 32
End: 2022-10-27
Payer: COMMERCIAL

## 2022-10-27 NOTE — PROGRESS NOTES
10/27/2022 Care Everywhere updates requested and reviewed.  Immunizations reconciled. Media reports reviewed.  Duplicate HM overrides and  orders removed.  Overdue HM topic chart audit and/or requested.  Overdue lab testing linked to upcoming lab appointments if applies.  Lab tiffanie, and Lifestyle Air reviewed   Pap Smear     Health Maintenance Due   Topic Date Due    Cervical Cancer Screening  04/10/2019    COVID-19 Vaccine (3 - Booster for Pfizer series) 2021

## 2022-10-31 ENCOUNTER — HOSPITAL ENCOUNTER (OUTPATIENT)
Dept: RADIOLOGY | Facility: HOSPITAL | Age: 32
Discharge: HOME | End: 2022-10-31
Attending: NURSE PRACTITIONER
Payer: COMMERCIAL

## 2022-10-31 DIAGNOSIS — K52.9 CHRONIC DIARRHEA: ICD-10-CM

## 2022-10-31 DIAGNOSIS — R63.8 DECREASED ORAL INTAKE: ICD-10-CM

## 2022-10-31 DIAGNOSIS — R10.30 LOWER ABDOMINAL PAIN: ICD-10-CM

## 2022-10-31 DIAGNOSIS — R11.0 NAUSEA: ICD-10-CM

## 2022-10-31 DIAGNOSIS — R10.84 GENERALIZED ABDOMINAL PAIN: ICD-10-CM

## 2022-10-31 DIAGNOSIS — R63.4 WEIGHT LOSS: ICD-10-CM

## 2022-10-31 PROCEDURE — 25500020 PHARM REV CODE 255: Mod: PO | Performed by: NURSE PRACTITIONER

## 2022-10-31 PROCEDURE — A9698 NON-RAD CONTRAST MATERIALNOC: HCPCS | Mod: PO | Performed by: NURSE PRACTITIONER

## 2022-10-31 PROCEDURE — 74177 CT ABD & PELVIS W/CONTRAST: CPT | Mod: 26,,, | Performed by: RADIOLOGY

## 2022-10-31 PROCEDURE — 74177 CT ABD & PELVIS W/CONTRAST: CPT | Mod: TC,PO

## 2022-10-31 PROCEDURE — 74177 CT ABDOMEN PELVIS WITH CONTRAST: ICD-10-PCS | Mod: 26,,, | Performed by: RADIOLOGY

## 2022-10-31 RX ADMIN — BARIUM SULFATE 900 ML: 20 SUSPENSION ORAL at 12:10

## 2022-10-31 RX ADMIN — IOHEXOL 75 ML: 350 INJECTION, SOLUTION INTRAVENOUS at 11:10

## 2022-11-01 ENCOUNTER — TELEPHONE (OUTPATIENT)
Dept: GASTROENTEROLOGY | Facility: CLINIC | Age: 32
End: 2022-11-01
Payer: COMMERCIAL

## 2022-11-01 ENCOUNTER — TELEPHONE (OUTPATIENT)
Dept: SURGERY | Facility: CLINIC | Age: 32
End: 2022-11-01
Payer: COMMERCIAL

## 2022-11-01 DIAGNOSIS — Q43.3 INTESTINAL MALROTATION: Primary | ICD-10-CM

## 2022-11-01 NOTE — TELEPHONE ENCOUNTER
"I discussed the results and case with Dr. Main. Dr. Main reports malrotation is likely something she has had since she was born and imaging showed no acute findings. Dr. Main recommends treatment for IBS and continue with bentyl as previously prescribed.    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 Recommend follow-up with general surgery for continued evaluation and management of this finding. Referral placed.  Otherwise, unremarkable findings.    Continue with previous recommendations. If no improvement in symptoms or symptoms worsen, call/follow-up at clinic or go to ER.    Thanks,      "

## 2022-11-01 NOTE — TELEPHONE ENCOUNTER
Spoke with pt. Informed of results & recommendations per Abimbola Kim. General surgery scheduled.Pt verbalized understanding.

## 2022-11-01 NOTE — TELEPHONE ENCOUNTER
Patient called back and I informed her that OTONIEL Marvin referred her to see Dr. Mccoy.  I scheduled her on Wednesday, 11/16/22 @ 2:45pm in [place].  Ar

## 2022-11-01 NOTE — TELEPHONE ENCOUNTER
THEA @ 3:44pm to inform patient that I scheduled her with Dr. Mccoy on Wednesday, 11/16/22 @ 2:45pm in [place] per OTONIEL Marvin.  Ar

## 2022-11-02 ENCOUNTER — OFFICE VISIT (OUTPATIENT)
Dept: SURGERY | Facility: CLINIC | Age: 32
End: 2022-11-02
Payer: COMMERCIAL

## 2022-11-02 ENCOUNTER — PATIENT MESSAGE (OUTPATIENT)
Dept: GASTROENTEROLOGY | Facility: CLINIC | Age: 32
End: 2022-11-02
Payer: COMMERCIAL

## 2022-11-02 ENCOUNTER — PATIENT MESSAGE (OUTPATIENT)
Dept: SURGERY | Facility: CLINIC | Age: 32
End: 2022-11-02

## 2022-11-02 VITALS — SYSTOLIC BLOOD PRESSURE: 105 MMHG | TEMPERATURE: 98 F | DIASTOLIC BLOOD PRESSURE: 75 MMHG | HEART RATE: 86 BPM

## 2022-11-02 DIAGNOSIS — R10.10 PAIN OF UPPER ABDOMEN: Primary | ICD-10-CM

## 2022-11-02 DIAGNOSIS — Q43.3 MALROTATION OF INTESTINE: ICD-10-CM

## 2022-11-02 PROCEDURE — 3078F PR MOST RECENT DIASTOLIC BLOOD PRESSURE < 80 MM HG: ICD-10-PCS | Mod: CPTII,S$GLB,, | Performed by: SURGERY

## 2022-11-02 PROCEDURE — 3078F DIAST BP <80 MM HG: CPT | Mod: CPTII,S$GLB,, | Performed by: SURGERY

## 2022-11-02 PROCEDURE — 3074F SYST BP LT 130 MM HG: CPT | Mod: CPTII,S$GLB,, | Performed by: SURGERY

## 2022-11-02 PROCEDURE — 99204 PR OFFICE/OUTPT VISIT, NEW, LEVL IV, 45-59 MIN: ICD-10-PCS | Mod: S$GLB,,, | Performed by: SURGERY

## 2022-11-02 PROCEDURE — 3074F PR MOST RECENT SYSTOLIC BLOOD PRESSURE < 130 MM HG: ICD-10-PCS | Mod: CPTII,S$GLB,, | Performed by: SURGERY

## 2022-11-02 PROCEDURE — 99204 OFFICE O/P NEW MOD 45 MIN: CPT | Mod: S$GLB,,, | Performed by: SURGERY

## 2022-11-04 ENCOUNTER — TELEPHONE (OUTPATIENT)
Dept: SURGERY | Facility: CLINIC | Age: 32
End: 2022-11-04
Payer: COMMERCIAL

## 2022-11-04 NOTE — TELEPHONE ENCOUNTER
----- Message from Betsy Aguilera sent at 11/4/2022  2:42 PM CDT -----  Contact: patient  Type:  Needs Medical Advice    Who Called: patient    Would the patient rather a call back or a response via [facility]? call  Best Call Back Number: [phone] (home)       Additional Information: Patient would like to schedule appt for surgery. Referral says all visits are scheduled.  Please call to advise

## 2022-11-07 DIAGNOSIS — Q43.3 MALROTATION OF INTESTINE: Primary | ICD-10-CM

## 2022-11-10 DIAGNOSIS — F41.9 ANXIETY: ICD-10-CM

## 2022-11-10 DIAGNOSIS — R10.84 GENERALIZED ABDOMINAL PAIN: ICD-10-CM

## 2022-11-10 RX ORDER — SERTRALINE HYDROCHLORIDE 50 MG/1
TABLET, FILM COATED ORAL
Qty: 30 TABLET | Refills: 0 | OUTPATIENT
Start: 2022-11-10

## 2022-11-10 RX ORDER — NAPROXEN 500 MG/1
500 TABLET ORAL 2 TIMES DAILY WITH MEALS
Qty: 30 TABLET | Refills: 0 | OUTPATIENT
Start: 2022-11-10 | End: 2022-11-25

## 2022-11-17 ENCOUNTER — OFFICE VISIT (OUTPATIENT)
Dept: FAMILY MEDICINE | Facility: CLINIC | Age: 32
End: 2022-11-17
Payer: COMMERCIAL

## 2022-11-17 ENCOUNTER — LAB VISIT (OUTPATIENT)
Dept: LAB | Facility: HOSPITAL | Age: 32
End: 2022-11-17
Attending: STUDENT IN AN ORGANIZED HEALTH CARE EDUCATION/TRAINING PROGRAM
Payer: COMMERCIAL

## 2022-11-17 VITALS
WEIGHT: 117.38 LBS | RESPIRATION RATE: 18 BRPM | DIASTOLIC BLOOD PRESSURE: 72 MMHG | SYSTOLIC BLOOD PRESSURE: 112 MMHG | BODY MASS INDEX: 20.8 KG/M2 | HEIGHT: 63 IN

## 2022-11-17 DIAGNOSIS — M54.6 ACUTE RIGHT-SIDED THORACIC BACK PAIN: ICD-10-CM

## 2022-11-17 DIAGNOSIS — Z02.0 SCHOOL PHYSICAL EXAM: Primary | ICD-10-CM

## 2022-11-17 DIAGNOSIS — F90.9 ATTENTION DEFICIT DISORDER WITH HYPERACTIVITY: ICD-10-CM

## 2022-11-17 DIAGNOSIS — Z02.0 SCHOOL PHYSICAL EXAM: ICD-10-CM

## 2022-11-17 LAB
BILIRUB SERPL-MCNC: NORMAL MG/DL
BLOOD URINE, POC: NORMAL
CLARITY, POC UA: CLEAR
COLOR, POC UA: YELLOW
GLUCOSE UR QL STRIP: NORMAL
KETONES UR QL STRIP: NORMAL
LEUKOCYTE ESTERASE URINE, POC: NORMAL
NITRITE, POC UA: NORMAL
PH, POC UA: 7
PROTEIN, POC: NORMAL
SPECIFIC GRAVITY, POC UA: 1.01
UROBILINOGEN, POC UA: 0.2

## 2022-11-17 PROCEDURE — 86706 HEP B SURFACE ANTIBODY: CPT | Performed by: STUDENT IN AN ORGANIZED HEALTH CARE EDUCATION/TRAINING PROGRAM

## 2022-11-17 PROCEDURE — 99999 PR PBB SHADOW E&M-EST. PATIENT-LVL III: ICD-10-PCS | Mod: PBBFAC,,, | Performed by: STUDENT IN AN ORGANIZED HEALTH CARE EDUCATION/TRAINING PROGRAM

## 2022-11-17 PROCEDURE — 99999 PR PBB SHADOW E&M-EST. PATIENT-LVL III: CPT | Mod: PBBFAC,,, | Performed by: STUDENT IN AN ORGANIZED HEALTH CARE EDUCATION/TRAINING PROGRAM

## 2022-11-17 PROCEDURE — 3074F SYST BP LT 130 MM HG: CPT | Mod: CPTII,S$GLB,, | Performed by: STUDENT IN AN ORGANIZED HEALTH CARE EDUCATION/TRAINING PROGRAM

## 2022-11-17 PROCEDURE — 1160F PR REVIEW ALL MEDS BY PRESCRIBER/CLIN PHARMACIST DOCUMENTED: ICD-10-PCS | Mod: CPTII,S$GLB,, | Performed by: STUDENT IN AN ORGANIZED HEALTH CARE EDUCATION/TRAINING PROGRAM

## 2022-11-17 PROCEDURE — 36415 COLL VENOUS BLD VENIPUNCTURE: CPT | Mod: PN | Performed by: STUDENT IN AN ORGANIZED HEALTH CARE EDUCATION/TRAINING PROGRAM

## 2022-11-17 PROCEDURE — 86762 RUBELLA ANTIBODY: CPT | Performed by: STUDENT IN AN ORGANIZED HEALTH CARE EDUCATION/TRAINING PROGRAM

## 2022-11-17 PROCEDURE — 81002 URINALYSIS NONAUTO W/O SCOPE: CPT | Mod: S$GLB,,, | Performed by: STUDENT IN AN ORGANIZED HEALTH CARE EDUCATION/TRAINING PROGRAM

## 2022-11-17 PROCEDURE — 1160F RVW MEDS BY RX/DR IN RCRD: CPT | Mod: CPTII,S$GLB,, | Performed by: STUDENT IN AN ORGANIZED HEALTH CARE EDUCATION/TRAINING PROGRAM

## 2022-11-17 PROCEDURE — 86787 VARICELLA-ZOSTER ANTIBODY: CPT | Performed by: STUDENT IN AN ORGANIZED HEALTH CARE EDUCATION/TRAINING PROGRAM

## 2022-11-17 PROCEDURE — 86765 RUBEOLA ANTIBODY: CPT | Performed by: STUDENT IN AN ORGANIZED HEALTH CARE EDUCATION/TRAINING PROGRAM

## 2022-11-17 PROCEDURE — 1159F MED LIST DOCD IN RCRD: CPT | Mod: CPTII,S$GLB,, | Performed by: STUDENT IN AN ORGANIZED HEALTH CARE EDUCATION/TRAINING PROGRAM

## 2022-11-17 PROCEDURE — 3078F PR MOST RECENT DIASTOLIC BLOOD PRESSURE < 80 MM HG: ICD-10-PCS | Mod: CPTII,S$GLB,, | Performed by: STUDENT IN AN ORGANIZED HEALTH CARE EDUCATION/TRAINING PROGRAM

## 2022-11-17 PROCEDURE — 81002 POCT URINE DIPSTICK WITHOUT MICROSCOPE: ICD-10-PCS | Mod: S$GLB,,, | Performed by: STUDENT IN AN ORGANIZED HEALTH CARE EDUCATION/TRAINING PROGRAM

## 2022-11-17 PROCEDURE — 86735 MUMPS ANTIBODY: CPT | Performed by: STUDENT IN AN ORGANIZED HEALTH CARE EDUCATION/TRAINING PROGRAM

## 2022-11-17 PROCEDURE — 3078F DIAST BP <80 MM HG: CPT | Mod: CPTII,S$GLB,, | Performed by: STUDENT IN AN ORGANIZED HEALTH CARE EDUCATION/TRAINING PROGRAM

## 2022-11-17 PROCEDURE — 99214 PR OFFICE/OUTPT VISIT, EST, LEVL IV, 30-39 MIN: ICD-10-PCS | Mod: S$GLB,,, | Performed by: STUDENT IN AN ORGANIZED HEALTH CARE EDUCATION/TRAINING PROGRAM

## 2022-11-17 PROCEDURE — 1159F PR MEDICATION LIST DOCUMENTED IN MEDICAL RECORD: ICD-10-PCS | Mod: CPTII,S$GLB,, | Performed by: STUDENT IN AN ORGANIZED HEALTH CARE EDUCATION/TRAINING PROGRAM

## 2022-11-17 PROCEDURE — 3074F PR MOST RECENT SYSTOLIC BLOOD PRESSURE < 130 MM HG: ICD-10-PCS | Mod: CPTII,S$GLB,, | Performed by: STUDENT IN AN ORGANIZED HEALTH CARE EDUCATION/TRAINING PROGRAM

## 2022-11-17 PROCEDURE — 3008F PR BODY MASS INDEX (BMI) DOCUMENTED: ICD-10-PCS | Mod: CPTII,S$GLB,, | Performed by: STUDENT IN AN ORGANIZED HEALTH CARE EDUCATION/TRAINING PROGRAM

## 2022-11-17 PROCEDURE — 99214 OFFICE O/P EST MOD 30 MIN: CPT | Mod: S$GLB,,, | Performed by: STUDENT IN AN ORGANIZED HEALTH CARE EDUCATION/TRAINING PROGRAM

## 2022-11-17 PROCEDURE — 3008F BODY MASS INDEX DOCD: CPT | Mod: CPTII,S$GLB,, | Performed by: STUDENT IN AN ORGANIZED HEALTH CARE EDUCATION/TRAINING PROGRAM

## 2022-11-17 RX ORDER — CYCLOBENZAPRINE HCL 5 MG
5 TABLET ORAL 3 TIMES DAILY PRN
Qty: 30 TABLET | Refills: 0 | Status: SHIPPED | OUTPATIENT
Start: 2022-11-17 | End: 2022-11-27

## 2022-11-17 RX ORDER — LISDEXAMFETAMINE DIMESYLATE 30 MG/1
30 CAPSULE ORAL EVERY MORNING
Qty: 30 CAPSULE | Refills: 0 | Status: SHIPPED | OUTPATIENT
Start: 2022-11-17 | End: 2023-01-13 | Stop reason: SDUPTHER

## 2022-11-17 NOTE — PROGRESS NOTES
"Subjective:      Patient ID: Linda Sherwood is a 32 y.o. female    Chief Complaint   Patient presents with    Annual Exam     Nursing school     HPI  32 y.o. female with a PMHx as documented below presents to clinic today for the following:  - Currently on Vyvanse 20 mg daily, doing well but would like to try higher dose for better control of symptoms. No reported headaches, dizziness, lightheadedness, chest pain, palpitations, SOB.   - Pt reports right-sided back pain for the past 5 days. Pt reports waking up with the pain - no identifiable accident or injury. Pain is described as dull with intermittent sharp pain with leaning/turning. Pt states that the pain is significantly limiting her ROM - turning and leaning to the left.   - Needs school physical form filled out, nursing school.    Past Medical History:   Diagnosis Date    Anxiety     Attention deficit disorder with hyperactivity 01/15/2018    Chronic bilateral low back pain without sciatica 03/22/2018    Colon polyp     Depression     Diverticulitis     Diverticulosis     Gastritis     GERD (gastroesophageal reflux disease) 04/12/2018      Review of Systems   Constitutional:  Negative for chills and fever.   Respiratory:  Negative for shortness of breath.    Cardiovascular:  Negative for chest pain.   Gastrointestinal:  Negative for abdominal pain, constipation, diarrhea, nausea and vomiting.   Genitourinary:  Negative for dysuria.   Musculoskeletal:  Positive for back pain and myalgias.     Objective:      Vitals:    11/17/22 1542   BP: 112/72   BP Location: Left arm   Patient Position: Sitting   Resp: 18   Weight: 53.3 kg (117 lb 6.3 oz)   Height: 5' 3" (1.6 m)     Physical Exam  Vitals reviewed.   Constitutional:       General: She is not in acute distress.  HENT:      Head: Normocephalic and atraumatic.   Cardiovascular:      Rate and Rhythm: Normal rate.   Pulmonary:      Effort: Pulmonary effort is normal. No respiratory distress. "   Musculoskeletal:      Thoracic back: Tenderness (muscle) present. No bony tenderness. Decreased range of motion (limited rotation towards left side).   Neurological:      General: No focal deficit present.      Mental Status: She is alert and oriented to person, place, and time. Mental status is at baseline.      Assessment:       1. School physical exam    2. Acute right-sided thoracic back pain    3. Attention deficit disorder with hyperactivity      Plan:       Linda was seen today for annual exam.    Diagnoses and all orders for this visit:    School physical exam  -     Rubella antibody, IgG; Future  -     Rubeola antibody IgG; Future  -     Mumps, IgG Screen; Future  -     Hepatitis B Surface Antibody, Qual/Quant; Future  -     Varicella zoster antibody, IgG; Future    Acute right-sided thoracic back pain  -     POCT URINE DIPSTICK WITHOUT MICROSCOPE  -     cyclobenzaprine (FLEXERIL) 5 MG tablet; Take 1 tablet (5 mg total) by mouth 3 (three) times daily as needed for Muscle spasms.    Attention deficit disorder with hyperactivity  -     lisdexamfetamine (VYVANSE) 30 MG capsule; Take 1 capsule (30 mg total) by mouth every morning.    Patient will need TB skin test in early December.  Advised patient to make nursing appointment early in the week, either Monday or Tuesday, for TB test administration and then returned later in the week to read TB test.    For reported back pain, UA ordered to rule out any kidney involvement or UTI.  Likely acute muscle spasm.  We will try Flexeril as above.  Patient plans to see her chiropractor as well.  If symptoms persist, will refer to physical therapy.    Follow up in about 4 weeks (around 12/15/2022), or if symptoms worsen or fail to improve.    Betsy Goodrich MD  [facility]  Office: [phone]   Fax: [phone]  11/17/2022      Disclaimer: This note was partly generated using dictation software which may occasionally result in  transcription errors.

## 2022-11-18 LAB
RUBV IGG SER-ACNC: 61.8 IU/ML
RUBV IGG SER-IMP: REACTIVE

## 2022-11-18 NOTE — H&P (VIEW-ONLY)
GENERAL SURGERY  OUTPATIENT H&P    REASON FOR VISIT/CC: Abdominal pain     HPI: Linda Sherwood is a 32 y.o. female here for evaluation of chronic abdominal symptoms. During workup patient was noted to have malrotation and was sent to General surgery for evaluation. Upon discussion the patient reports she had a surgery as a young child for the malrotation.  She believes her appendix was removed. She reports a several year history of intermittent cramping abdominal pain that can be associated with diarrhea. Eating tends to trigger her symptoms. She is not been able to identify any specific food which exacerbates her symptoms. She has undergone EGD and colonoscopy. She did have evidence of esophageal candidiasis on EGD and diverticulum with some mild inflammation and polyps on colonoscopy. Reports she was treated for with a candidiasis. Has been seen by GI again through [facility]. Has been given a trial of Bentyl but has not taken it consistently. Stool studies have been ordered and nothing has come back significantly abnormal other than yeast in stool for which she was started on Mycostatin. She has had an ultrasound of her gallbladder at an outside facility which did not show stones. She also underwent HIDA scan which was reported as normal. She does report diarrhea associated with her symptoms. She has had weight loss. She has fear of eating due to pain.    I have reviewed the patient's chart including prior progress notes, procedures and testing.     ROS:   Review of Systems   All other systems reviewed and are negative.    PROBLEM LIST:  Patient Active Problem List   Diagnosis    Chronic bilateral low back pain without sciatica    GERD (gastroesophageal reflux disease)    Anxiety    Attention deficit disorder with hyperactivity    Depression    Generalized abdominal pain    Intermittent diarrhea    Weight loss    Loss of appetite         HISTORY  Past Medical History:   Diagnosis Date    Anxiety     Attention  deficit disorder with hyperactivity 01/15/2018    Chronic bilateral low back pain without sciatica 03/22/2018    Colon polyp     Depression     Diverticulitis     Diverticulosis     Gastritis     GERD (gastroesophageal reflux disease) 04/12/2018       Past Surgical History:   Procedure Laterality Date    APPENDECTOMY      AUGMENTATION OF BREAST  2009    COLONOSCOPY  09/28/2021    in media/procedures, Dr. Garay, biopsy report not available    EGD - EXTERNAL RESULT  09/28/2021    in media/procedures, Dr. 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       Social History     Tobacco Use    Smoking status: Former    Smokeless tobacco: Never   Substance Use Topics    Alcohol use: No    Drug use: No       Family History   Problem Relation Age of Onset    Ovarian cancer Sister 25    Heart disease Paternal Grandfather     Colon cancer Neg Hx     Crohn's disease Neg Hx     Esophageal cancer Neg Hx     Stomach cancer Neg Hx     Ulcerative colitis Neg Hx     Celiac disease Neg Hx          MEDS:  Current Outpatient Medications on File Prior to Visit   Medication Sig Dispense Refill    omeprazole (PRILOSEC) 40 MG capsule Take 1 capsule (40 mg total) by mouth before breakfast. 30 capsule 2    dicyclomine (BENTYL) 20 mg tablet Take 20 mg by mouth every 6 (six) hours.       No current facility-administered medications on file prior to visit.       ALLERGIES:  Review of patient's allergies indicates:  No Known Allergies      VITALS:  Vitals:    11/02/22 1445   BP: 105/75   Pulse: 86   Temp: 98.2 °F (36.8 °C)         PHYSICAL EXAM:  Physical Exam  Vitals reviewed.   Constitutional:       General: She is not in acute distress.     Appearance: Normal appearance. She is well-developed.   HENT:      Head: Normocephalic and atraumatic.      Nose: Nose normal.   Eyes:      General: No scleral icterus.     Conjunctiva/sclera: Conjunctivae normal.   Neck:      Trachea: No tracheal tenderness or  tracheal deviation.   Cardiovascular:      Rate and Rhythm: Normal rate and regular rhythm.      Pulses: Normal pulses.   Pulmonary:      Effort: Pulmonary effort is normal. No accessory muscle usage or respiratory distress.      Breath sounds: Normal breath sounds.   Abdominal:      General: There is no distension.      Palpations: Abdomen is soft.      Tenderness: There is no abdominal tenderness.      Comments: Well-healed right upper abdominal incision from previous surgery as a child   Musculoskeletal:         General: No swelling or tenderness. Normal range of motion.      Cervical back: Normal range of motion and neck supple. No rigidity.   Skin:     General: Skin is warm and dry.      Coloration: Skin is not jaundiced.      Findings: No erythema.   Neurological:      General: No focal deficit present.      Mental Status: She is alert and oriented to person, place, and time.      Motor: No weakness or abnormal muscle tone.   Psychiatric:         Mood and Affect: Mood normal.         Behavior: Behavior normal.         Thought Content: Thought content normal.         Judgment: Judgment normal.         LABS:  Lab Results   Component Value Date    WBC 6.89 10/21/2022    RBC 4.56 10/21/2022    HGB 13.2 10/21/2022    HCT 40.4 10/21/2022     10/21/2022     Lab Results   Component Value Date    GLU 92 03/22/2018     03/22/2018    K 3.7 03/22/2018     03/22/2018    CO2 27 03/22/2018    BUN 11 03/22/2018    CREATININE 0.8 03/22/2018    CALCIUM 9.5 03/22/2018     Lab Results   Component Value Date    ALT 14 10/21/2022    AST 16 10/21/2022    ALKPHOS 69 10/21/2022    BILITOT 0.2 10/21/2022     Lab Results   Component Value Date    MG 2.1 12/18/2020       STUDIES:  Images and reports were personally reviewed.  CT imaging reviewed. Outside HIDA report evaluated as well.  COMPARISON:  None.     FINDINGS:  The liver is normal in size and contour without suspicious focal abnormality.  The spleen is upper  normal in size at 12.6 cm.  The gallbladder is unremarkable.  There is no biliary dilatation.     The pancreas, adrenal glands, and kidneys are unremarkable.     The colon is located within the left hemiabdomen and small bowel is centrally exclusively within the right hemiabdomen.  The superior mesenteric vein courses to the left of the superior mesenteric artery, with these findings in keeping with malrotation.  No evidence for accompanying complication.     Pelvic structures exhibit no significant pathology.  There is a corpus luteum of the right ovary.     Impression:     No acute abdominopelvic process.     Malrotation.    ASSESSMENT & PLAN:  32 y.o. female with chronic abdominal pain nausea, diarrhea; malrotation with likely history of [place]'s procedure  -uncertain etiology of the patient's symptoms, considerations include partial obstruction, SMA syndrome, median arcuate ligament syndrome, food allergies, IBS, biliary colic/biliary dyskinesia/biliary hyperkinesia, etc.  -CT imaging reviewed, no obvious hiatal hernia, gallbladder does not show any stones, there is malrotation but the cecum does appear to be in the right lower quadrant, do not clearly identify the appendix, vasculature evaluated and there was no obvious compression of the duodenum from the SMA indicating SMA syndrome or obvious narrowing of the celiac artery which might be indicative of median arcuate ligament syndrome  -outside HIDA scan results reviewed with an ejection fraction of 39%  -though I am uncertain that her malrotation is the cause of her symptoms have discussed with the patient possible diagnostic laparoscopy to assure all lead bands are lysed and there was no significant intra abdominal adhesions which may be the cause of her symptoms, we did review the possibility of having to perform an open procedure if the anatomy is too difficult, we also discussed the risks including failure to relieve symptoms, injury to bowel, fistula,  injury to other organ, etc., patient expressed understanding these risks and desired surgical exploration  -will plan for diagnostic laparoscopy on 11/29/2022

## 2022-11-18 NOTE — H&P
GENERAL SURGERY  OUTPATIENT H&P    REASON FOR VISIT/CC: Abdominal pain     HPI: Linda Sherwood is a 32 y.o. female here for evaluation of chronic abdominal symptoms. During workup patient was noted to have malrotation and was sent to General surgery for evaluation. Upon discussion the patient reports she had a surgery as a young child for the malrotation.  She believes her appendix was removed. She reports a several year history of intermittent cramping abdominal pain that can be associated with diarrhea. Eating tends to trigger her symptoms. She is not been able to identify any specific food which exacerbates her symptoms. She has undergone EGD and colonoscopy. She did have evidence of esophageal candidiasis on EGD and diverticulum with some mild inflammation and polyps on colonoscopy. Reports she was treated for with a candidiasis. Has been seen by GI again through [facility]. Has been given a trial of Bentyl but has not taken it consistently. Stool studies have been ordered and nothing has come back significantly abnormal other than yeast in stool for which she was started on Mycostatin. She has had an ultrasound of her gallbladder at an outside facility which did not show stones. She also underwent HIDA scan which was reported as normal. She does report diarrhea associated with her symptoms. She has had weight loss. She has fear of eating due to pain.    I have reviewed the patient's chart including prior progress notes, procedures and testing.     ROS:   Review of Systems   All other systems reviewed and are negative.    PROBLEM LIST:  Patient Active Problem List   Diagnosis    Chronic bilateral low back pain without sciatica    GERD (gastroesophageal reflux disease)    Anxiety    Attention deficit disorder with hyperactivity    Depression    Generalized abdominal pain    Intermittent diarrhea    Weight loss    Loss of appetite         HISTORY  Past Medical History:   Diagnosis Date    Anxiety     Attention  deficit disorder with hyperactivity 01/15/2018    Chronic bilateral low back pain without sciatica 03/22/2018    Colon polyp     Depression     Diverticulitis     Diverticulosis     Gastritis     GERD (gastroesophageal reflux disease) 04/12/2018       Past Surgical History:   Procedure Laterality Date    APPENDECTOMY      AUGMENTATION OF BREAST  2009    COLONOSCOPY  09/28/2021    in media/procedures, Dr. Garay, biopsy report not available    EGD - EXTERNAL RESULT  09/28/2021    in media/procedures, Dr. 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       Social History     Tobacco Use    Smoking status: Former    Smokeless tobacco: Never   Substance Use Topics    Alcohol use: No    Drug use: No       Family History   Problem Relation Age of Onset    Ovarian cancer Sister 25    Heart disease Paternal Grandfather     Colon cancer Neg Hx     Crohn's disease Neg Hx     Esophageal cancer Neg Hx     Stomach cancer Neg Hx     Ulcerative colitis Neg Hx     Celiac disease Neg Hx          MEDS:  Current Outpatient Medications on File Prior to Visit   Medication Sig Dispense Refill    omeprazole (PRILOSEC) 40 MG capsule Take 1 capsule (40 mg total) by mouth before breakfast. 30 capsule 2    dicyclomine (BENTYL) 20 mg tablet Take 20 mg by mouth every 6 (six) hours.       No current facility-administered medications on file prior to visit.       ALLERGIES:  Review of patient's allergies indicates:  No Known Allergies      VITALS:  Vitals:    11/02/22 1445   BP: 105/75   Pulse: 86   Temp: 98.2 °F (36.8 °C)         PHYSICAL EXAM:  Physical Exam  Vitals reviewed.   Constitutional:       General: She is not in acute distress.     Appearance: Normal appearance. She is well-developed.   HENT:      Head: Normocephalic and atraumatic.      Nose: Nose normal.   Eyes:      General: No scleral icterus.     Conjunctiva/sclera: Conjunctivae normal.   Neck:      Trachea: No tracheal tenderness or  tracheal deviation.   Cardiovascular:      Rate and Rhythm: Normal rate and regular rhythm.      Pulses: Normal pulses.   Pulmonary:      Effort: Pulmonary effort is normal. No accessory muscle usage or respiratory distress.      Breath sounds: Normal breath sounds.   Abdominal:      General: There is no distension.      Palpations: Abdomen is soft.      Tenderness: There is no abdominal tenderness.      Comments: Well-healed right upper abdominal incision from previous surgery as a child   Musculoskeletal:         General: No swelling or tenderness. Normal range of motion.      Cervical back: Normal range of motion and neck supple. No rigidity.   Skin:     General: Skin is warm and dry.      Coloration: Skin is not jaundiced.      Findings: No erythema.   Neurological:      General: No focal deficit present.      Mental Status: She is alert and oriented to person, place, and time.      Motor: No weakness or abnormal muscle tone.   Psychiatric:         Mood and Affect: Mood normal.         Behavior: Behavior normal.         Thought Content: Thought content normal.         Judgment: Judgment normal.         LABS:  Lab Results   Component Value Date    WBC 6.89 10/21/2022    RBC 4.56 10/21/2022    HGB 13.2 10/21/2022    HCT 40.4 10/21/2022     10/21/2022     Lab Results   Component Value Date    GLU 92 03/22/2018     03/22/2018    K 3.7 03/22/2018     03/22/2018    CO2 27 03/22/2018    BUN 11 03/22/2018    CREATININE 0.8 03/22/2018    CALCIUM 9.5 03/22/2018     Lab Results   Component Value Date    ALT 14 10/21/2022    AST 16 10/21/2022    ALKPHOS 69 10/21/2022    BILITOT 0.2 10/21/2022     Lab Results   Component Value Date    MG 2.1 12/18/2020       STUDIES:  Images and reports were personally reviewed.  CT imaging reviewed. Outside HIDA report evaluated as well.  COMPARISON:  None.     FINDINGS:  The liver is normal in size and contour without suspicious focal abnormality.  The spleen is upper  normal in size at 12.6 cm.  The gallbladder is unremarkable.  There is no biliary dilatation.     The pancreas, adrenal glands, and kidneys are unremarkable.     The colon is located within the left hemiabdomen and small bowel is centrally exclusively within the right hemiabdomen.  The superior mesenteric vein courses to the left of the superior mesenteric artery, with these findings in keeping with malrotation.  No evidence for accompanying complication.     Pelvic structures exhibit no significant pathology.  There is a corpus luteum of the right ovary.     Impression:     No acute abdominopelvic process.     Malrotation.    ASSESSMENT & PLAN:  32 y.o. female with chronic abdominal pain nausea, diarrhea; malrotation with likely history of [place]'s procedure  -uncertain etiology of the patient's symptoms, considerations include partial obstruction, SMA syndrome, median arcuate ligament syndrome, food allergies, IBS, biliary colic/biliary dyskinesia/biliary hyperkinesia, etc.  -CT imaging reviewed, no obvious hiatal hernia, gallbladder does not show any stones, there is malrotation but the cecum does appear to be in the right lower quadrant, do not clearly identify the appendix, vasculature evaluated and there was no obvious compression of the duodenum from the SMA indicating SMA syndrome or obvious narrowing of the celiac artery which might be indicative of median arcuate ligament syndrome  -outside HIDA scan results reviewed with an ejection fraction of 39%  -though I am uncertain that her malrotation is the cause of her symptoms have discussed with the patient possible diagnostic laparoscopy to assure all lead bands are lysed and there was no significant intra abdominal adhesions which may be the cause of her symptoms, we did review the possibility of having to perform an open procedure if the anatomy is too difficult, we also discussed the risks including failure to relieve symptoms, injury to bowel, fistula,  injury to other organ, etc., patient expressed understanding these risks and desired surgical exploration  -will plan for diagnostic laparoscopy on 11/29/2022

## 2022-11-21 LAB
HBV SURFACE AB SER QL IA: POSITIVE
HBV SURFACE AB SERPL IA-ACNC: NORMAL MIU/ML
MUMPS IGG INTERPRETATION: POSITIVE
MUMPS IGG SCREEN: 1.9 ISR (ref 0–0.9)
RUBEOLA IGG ANTIBODY: 2.59 ISR (ref 0–0.9)
RUBEOLA INTERPRETATION: POSITIVE
VARICELLA INTERPRETATION: POSITIVE
VARICELLA ZOSTER IGG: 3.15 ISR (ref 0–0.9)

## 2022-11-25 ENCOUNTER — HOSPITAL ENCOUNTER (OUTPATIENT)
Dept: PREADMISSION TESTING | Facility: HOSPITAL | Age: 32
Discharge: HOME | End: 2022-11-25
Payer: COMMERCIAL

## 2022-11-28 ENCOUNTER — ANESTHESIA EVENT (OUTPATIENT)
Dept: SURGERY | Facility: HOSPITAL | Age: 32
End: 2022-11-28
Payer: COMMERCIAL

## 2022-11-29 ENCOUNTER — HOSPITAL ENCOUNTER (OUTPATIENT)
Facility: HOSPITAL | Age: 32
Discharge: HOME | End: 2022-11-30
Attending: SURGERY | Admitting: SURGERY
Payer: COMMERCIAL

## 2022-11-29 ENCOUNTER — ANESTHESIA (OUTPATIENT)
Dept: SURGERY | Facility: HOSPITAL | Age: 32
End: 2022-11-29
Payer: COMMERCIAL

## 2022-11-29 DIAGNOSIS — Q43.3 MALROTATION OF INTESTINE: ICD-10-CM

## 2022-11-29 LAB
B-HCG UR QL: NEGATIVE
CTP QC/QA: YES

## 2022-11-29 PROCEDURE — D9220A PRA ANESTHESIA: ICD-10-PCS | Mod: ANES,,, | Performed by: ANESTHESIOLOGY

## 2022-11-29 PROCEDURE — 99900103 DSU ONLY-NO CHARGE-INITIAL HR (STAT): Performed by: SURGERY

## 2022-11-29 PROCEDURE — 94761 N-INVAS EAR/PLS OXIMETRY MLT: CPT

## 2022-11-29 PROCEDURE — 63600175 PHARM REV CODE 636 W HCPCS: Performed by: ANESTHESIOLOGY

## 2022-11-29 PROCEDURE — 37000008 HC ANESTHESIA 1ST 15 MINUTES: Performed by: SURGERY

## 2022-11-29 PROCEDURE — 99900035 HC TECH TIME PER 15 MIN (STAT)

## 2022-11-29 PROCEDURE — 81025 URINE PREGNANCY TEST: CPT | Performed by: ANESTHESIOLOGY

## 2022-11-29 PROCEDURE — 99900104 DSU ONLY-NO CHARGE-EA ADD'L HR (STAT): Performed by: SURGERY

## 2022-11-29 PROCEDURE — 63600175 PHARM REV CODE 636 W HCPCS: Performed by: SURGERY

## 2022-11-29 PROCEDURE — 25000003 PHARM REV CODE 250: Performed by: ANESTHESIOLOGY

## 2022-11-29 PROCEDURE — D9220A PRA ANESTHESIA: Mod: ANES,,, | Performed by: ANESTHESIOLOGY

## 2022-11-29 PROCEDURE — 27201423 OPTIME MED/SURG SUP & DEVICES STERILE SUPPLY: Performed by: SURGERY

## 2022-11-29 PROCEDURE — D9220A PRA ANESTHESIA: Mod: CRNA,,, | Performed by: NURSE ANESTHETIST, CERTIFIED REGISTERED

## 2022-11-29 PROCEDURE — 36000708 HC OR TIME LEV III 1ST 15 MIN: Performed by: SURGERY

## 2022-11-29 PROCEDURE — 25000003 PHARM REV CODE 250: Performed by: SURGERY

## 2022-11-29 PROCEDURE — 36000709 HC OR TIME LEV III EA ADD 15 MIN: Performed by: SURGERY

## 2022-11-29 PROCEDURE — D9220A PRA ANESTHESIA: ICD-10-PCS | Mod: CRNA,,, | Performed by: NURSE ANESTHETIST, CERTIFIED REGISTERED

## 2022-11-29 PROCEDURE — 25000003 PHARM REV CODE 250: Performed by: NURSE ANESTHETIST, CERTIFIED REGISTERED

## 2022-11-29 PROCEDURE — 94799 UNLISTED PULMONARY SVC/PX: CPT

## 2022-11-29 PROCEDURE — 71000039 HC RECOVERY, EACH ADD'L HOUR: Performed by: SURGERY

## 2022-11-29 PROCEDURE — 37000009 HC ANESTHESIA EA ADD 15 MINS: Performed by: SURGERY

## 2022-11-29 PROCEDURE — 44180 LAP ENTEROLYSIS: CPT | Mod: ,,, | Performed by: SURGERY

## 2022-11-29 PROCEDURE — 44180 PR LAP, SURG ENTEROLYSIS: ICD-10-PCS | Mod: ,,, | Performed by: SURGERY

## 2022-11-29 PROCEDURE — 71000033 HC RECOVERY, INTIAL HOUR: Performed by: SURGERY

## 2022-11-29 PROCEDURE — 63600175 PHARM REV CODE 636 W HCPCS: Performed by: NURSE ANESTHETIST, CERTIFIED REGISTERED

## 2022-11-29 RX ORDER — SERTRALINE HYDROCHLORIDE 50 MG/1
50 TABLET, FILM COATED ORAL DAILY
Status: DISCONTINUED | OUTPATIENT
Start: 2022-11-30 | End: 2022-11-30 | Stop reason: HOSPADM

## 2022-11-29 RX ORDER — CEFOXITIN SODIUM 2 G/50ML
2 INJECTION, SOLUTION INTRAVENOUS
Status: COMPLETED | OUTPATIENT
Start: 2022-11-29 | End: 2022-11-29

## 2022-11-29 RX ORDER — MORPHINE SULFATE 2 MG/ML
2 INJECTION, SOLUTION INTRAMUSCULAR; INTRAVENOUS EVERY 4 HOURS PRN
Status: DISCONTINUED | OUTPATIENT
Start: 2022-11-29 | End: 2022-11-30 | Stop reason: HOSPADM

## 2022-11-29 RX ORDER — SODIUM CHLORIDE, SODIUM LACTATE, POTASSIUM CHLORIDE, CALCIUM CHLORIDE 600; 310; 30; 20 MG/100ML; MG/100ML; MG/100ML; MG/100ML
INJECTION, SOLUTION INTRAVENOUS CONTINUOUS
Status: DISCONTINUED | OUTPATIENT
Start: 2022-11-29 | End: 2022-11-29

## 2022-11-29 RX ORDER — HYDROCODONE BITARTRATE AND ACETAMINOPHEN 5; 325 MG/1; MG/1
1 TABLET ORAL EVERY 6 HOURS PRN
Qty: 20 TABLET | Refills: 0 | Status: SHIPPED | OUTPATIENT
Start: 2022-11-29 | End: 2023-01-13

## 2022-11-29 RX ORDER — DEXAMETHASONE SODIUM PHOSPHATE 4 MG/ML
INJECTION, SOLUTION INTRA-ARTICULAR; INTRALESIONAL; INTRAMUSCULAR; INTRAVENOUS; SOFT TISSUE
Status: DISCONTINUED | OUTPATIENT
Start: 2022-11-29 | End: 2022-11-29

## 2022-11-29 RX ORDER — FENTANYL CITRATE 50 UG/ML
INJECTION, SOLUTION INTRAMUSCULAR; INTRAVENOUS
Status: DISCONTINUED | OUTPATIENT
Start: 2022-11-29 | End: 2022-11-29

## 2022-11-29 RX ORDER — MIDAZOLAM HYDROCHLORIDE 1 MG/ML
INJECTION INTRAMUSCULAR; INTRAVENOUS
Status: DISCONTINUED | OUTPATIENT
Start: 2022-11-29 | End: 2022-11-29

## 2022-11-29 RX ORDER — IBUPROFEN 200 MG
600 TABLET ORAL EVERY 8 HOURS
Refills: 0 | COMMUNITY
Start: 2022-11-29 | End: 2022-12-04

## 2022-11-29 RX ORDER — OXYCODONE HYDROCHLORIDE 5 MG/1
5 TABLET ORAL ONCE AS NEEDED
Status: COMPLETED | OUTPATIENT
Start: 2022-11-29 | End: 2022-11-29

## 2022-11-29 RX ORDER — PANTOPRAZOLE SODIUM 40 MG/1
40 TABLET, DELAYED RELEASE ORAL DAILY
Status: DISCONTINUED | OUTPATIENT
Start: 2022-11-30 | End: 2022-11-30 | Stop reason: HOSPADM

## 2022-11-29 RX ORDER — LIDOCAINE HYDROCHLORIDE 10 MG/ML
1 INJECTION, SOLUTION EPIDURAL; INFILTRATION; INTRACAUDAL; PERINEURAL ONCE
Status: DISCONTINUED | OUTPATIENT
Start: 2022-11-29 | End: 2022-11-29 | Stop reason: HOSPADM

## 2022-11-29 RX ORDER — IBUPROFEN 600 MG/1
600 TABLET ORAL EVERY 6 HOURS PRN
Status: DISCONTINUED | OUTPATIENT
Start: 2022-11-29 | End: 2022-11-30 | Stop reason: HOSPADM

## 2022-11-29 RX ORDER — ACETAMINOPHEN 325 MG/1
650 TABLET ORAL EVERY 8 HOURS PRN
Status: DISCONTINUED | OUTPATIENT
Start: 2022-11-29 | End: 2022-11-30 | Stop reason: HOSPADM

## 2022-11-29 RX ORDER — FENTANYL CITRATE 50 UG/ML
25 INJECTION, SOLUTION INTRAMUSCULAR; INTRAVENOUS EVERY 5 MIN PRN
Status: DISCONTINUED | OUTPATIENT
Start: 2022-11-29 | End: 2022-11-29 | Stop reason: HOSPADM

## 2022-11-29 RX ORDER — ACETAMINOPHEN 10 MG/ML
INJECTION, SOLUTION INTRAVENOUS
Status: DISCONTINUED | OUTPATIENT
Start: 2022-11-29 | End: 2022-11-29

## 2022-11-29 RX ORDER — PROMETHAZINE HYDROCHLORIDE 25 MG/ML
INJECTION, SOLUTION INTRAMUSCULAR; INTRAVENOUS
Status: DISCONTINUED | OUTPATIENT
Start: 2022-11-29 | End: 2022-11-29

## 2022-11-29 RX ORDER — ONDANSETRON HYDROCHLORIDE 2 MG/ML
INJECTION, SOLUTION INTRAMUSCULAR; INTRAVENOUS
Status: DISCONTINUED | OUTPATIENT
Start: 2022-11-29 | End: 2022-11-29

## 2022-11-29 RX ORDER — BUPIVACAINE HYDROCHLORIDE AND EPINEPHRINE 2.5; 5 MG/ML; UG/ML
INJECTION, SOLUTION EPIDURAL; INFILTRATION; INTRACAUDAL; PERINEURAL
Status: DISCONTINUED | OUTPATIENT
Start: 2022-11-29 | End: 2022-11-29 | Stop reason: HOSPADM

## 2022-11-29 RX ORDER — KETOROLAC TROMETHAMINE 30 MG/ML
INJECTION, SOLUTION INTRAMUSCULAR; INTRAVENOUS
Status: DISCONTINUED | OUTPATIENT
Start: 2022-11-29 | End: 2022-11-29

## 2022-11-29 RX ORDER — ONDANSETRON 4 MG/1
4 TABLET, ORALLY DISINTEGRATING ORAL EVERY 6 HOURS PRN
Status: DISCONTINUED | OUTPATIENT
Start: 2022-11-29 | End: 2022-11-30 | Stop reason: HOSPADM

## 2022-11-29 RX ORDER — OXYCODONE HYDROCHLORIDE 5 MG/1
5 TABLET ORAL EVERY 4 HOURS PRN
Status: DISCONTINUED | OUTPATIENT
Start: 2022-11-29 | End: 2022-11-30 | Stop reason: HOSPADM

## 2022-11-29 RX ORDER — SODIUM CHLORIDE 0.9 % (FLUSH) 0.9 %
10 SYRINGE (ML) INJECTION
Status: DISCONTINUED | OUTPATIENT
Start: 2022-11-29 | End: 2022-11-30 | Stop reason: HOSPADM

## 2022-11-29 RX ORDER — PROPOFOL 10 MG/ML
VIAL (ML) INTRAVENOUS
Status: DISCONTINUED | OUTPATIENT
Start: 2022-11-29 | End: 2022-11-29

## 2022-11-29 RX ORDER — METOCLOPRAMIDE HYDROCHLORIDE 5 MG/ML
10 INJECTION INTRAMUSCULAR; INTRAVENOUS EVERY 10 MIN PRN
Status: COMPLETED | OUTPATIENT
Start: 2022-11-29 | End: 2022-11-29

## 2022-11-29 RX ORDER — ROCURONIUM BROMIDE 10 MG/ML
INJECTION, SOLUTION INTRAVENOUS
Status: DISCONTINUED | OUTPATIENT
Start: 2022-11-29 | End: 2022-11-29

## 2022-11-29 RX ORDER — LIDOCAINE HCL/PF 100 MG/5ML
SYRINGE (ML) INTRAVENOUS
Status: DISCONTINUED | OUTPATIENT
Start: 2022-11-29 | End: 2022-11-29

## 2022-11-29 RX ADMIN — ROCURONIUM BROMIDE 30 MG: 10 INJECTION, SOLUTION INTRAVENOUS at 04:11

## 2022-11-29 RX ADMIN — DEXAMETHASONE SODIUM PHOSPHATE 4 MG: 4 INJECTION, SOLUTION INTRA-ARTICULAR; INTRALESIONAL; INTRAMUSCULAR; INTRAVENOUS; SOFT TISSUE at 04:11

## 2022-11-29 RX ADMIN — CEFOXITIN 2 G: 2 INJECTION, POWDER, FOR SOLUTION INTRAVENOUS at 04:11

## 2022-11-29 RX ADMIN — PROPOFOL 150 MG: 10 INJECTION, EMULSION INTRAVENOUS at 04:11

## 2022-11-29 RX ADMIN — SUGAMMADEX 100 MG: 100 INJECTION, SOLUTION INTRAVENOUS at 06:11

## 2022-11-29 RX ADMIN — PROMETHAZINE HYDROCHLORIDE 6.25 MG: 25 INJECTION INTRAMUSCULAR; INTRAVENOUS at 04:11

## 2022-11-29 RX ADMIN — MIDAZOLAM HYDROCHLORIDE 2 MG: 1 INJECTION, SOLUTION INTRAMUSCULAR; INTRAVENOUS at 04:11

## 2022-11-29 RX ADMIN — KETOROLAC TROMETHAMINE 15 MG: 30 INJECTION, SOLUTION INTRAMUSCULAR; INTRAVENOUS at 06:11

## 2022-11-29 RX ADMIN — ACETAMINOPHEN 900 MG: 10 INJECTION, SOLUTION INTRAVENOUS at 04:11

## 2022-11-29 RX ADMIN — ONDANSETRON 4 MG: 2 INJECTION INTRAMUSCULAR; INTRAVENOUS at 04:11

## 2022-11-29 RX ADMIN — FENTANYL CITRATE 100 MCG: 0.05 INJECTION, SOLUTION INTRAMUSCULAR; INTRAVENOUS at 04:11

## 2022-11-29 RX ADMIN — LIDOCAINE HYDROCHLORIDE 75 MG: 20 INJECTION INTRAVENOUS at 04:11

## 2022-11-29 RX ADMIN — FENTANYL CITRATE 50 MCG: 0.05 INJECTION, SOLUTION INTRAMUSCULAR; INTRAVENOUS at 05:11

## 2022-11-29 RX ADMIN — OXYCODONE 5 MG: 5 TABLET ORAL at 07:11

## 2022-11-29 RX ADMIN — FENTANYL CITRATE 50 MCG: 0.05 INJECTION, SOLUTION INTRAMUSCULAR; INTRAVENOUS at 06:11

## 2022-11-29 RX ADMIN — ONDANSETRON 4 MG: 2 INJECTION INTRAMUSCULAR; INTRAVENOUS at 06:11

## 2022-11-29 RX ADMIN — SODIUM CHLORIDE, SODIUM GLUCONATE, SODIUM ACETATE, POTASSIUM CHLORIDE, MAGNESIUM CHLORIDE, SODIUM PHOSPHATE, DIBASIC, AND POTASSIUM PHOSPHATE: .53; .5; .37; .037; .03; .012; .00082 INJECTION, SOLUTION INTRAVENOUS at 12:11

## 2022-11-29 RX ADMIN — METOCLOPRAMIDE 10 MG: 5 INJECTION, SOLUTION INTRAMUSCULAR; INTRAVENOUS at 07:11

## 2022-11-29 RX ADMIN — OXYCODONE 5 MG: 5 TABLET ORAL at 11:11

## 2022-11-29 NOTE — ANESTHESIA PROCEDURE NOTES
Intubation    Date/Time: 11/29/2022 4:28 PM  Performed by: Noman Nguyen CRNA  Authorized by: Randall Jones MD     Intubation:     Induction:  Intravenous    Intubated:  Postinduction    Mask Ventilation:  Easy mask    Attempts:  1    Attempted By:  CRNA    Method of Intubation:  Video laryngoscopy    Blade:  [place] 3    Laryngeal View Grade: Grade I - full view of cords      Difficult Airway Encountered?: No      Complications:  None    Airway Device:  Oral endotracheal tube    Airway Device Size:  7.0    Style/Cuff Inflation:  Cuffed    Inflation Amount (mL):  4    Tube secured:  21    Placement Verified By:  Capnometry    Complicating Factors:  None    Findings Post-Intubation:  BS equal bilateral

## 2022-11-29 NOTE — CARE UPDATE
11/29/22 1226   Patient Assessment/Suction   Level of Consciousness (AVPU) alert   Respiratory Effort Normal;Unlabored   Expansion/Accessory Muscles/Retractions no use of accessory muscles;no retractions;expansion symmetric   All Lung Fields Breath Sounds clear;equal bilaterally   Rhythm/Pattern, Respiratory unlabored;pattern regular;depth regular   Cough Frequency no cough   PRE-TX-O2   O2 Device (Oxygen Therapy) room air   Pulse Oximetry Type Intermittent   $ Pulse Oximetry - Multiple Charge Pulse Oximetry - Multiple   Pulse 93   Resp 18   Positioning Sitting in bed   Incentive Spirometer   $ Incentive Spirometer Charges preop instruction   Incentive Spirometer Predicted Level (mL) 2400   Administration (IS) instruction provided, initial   Number of Repetitions (IS) 10   Level Incentive Spirometer (mL) 2500   Patient Tolerance (IS) good

## 2022-11-29 NOTE — ANESTHESIA PREPROCEDURE EVALUATION
11/29/2022  Linda Sherwood is a 32 y.o., female.      Pre-op Assessment    I have reviewed the Patient Summary Reports.     I have reviewed the Nursing Notes. I have reviewed the NPO Status.   I have reviewed the Medications.     Review of Systems  Anesthesia Hx:  No problems with previous Anesthesia    Social:  Former Smoker    Hematology/Oncology:  Hematology Normal   Oncology Normal     EENT/Dental:EENT/Dental Normal   Cardiovascular:  Cardiovascular Normal     Pulmonary:  Pulmonary Normal    Renal/:  Renal/ Normal     Hepatic/GI:   GERD    Musculoskeletal:  Musculoskeletal Normal    Neurological:  Neurology Normal    Endocrine:  Endocrine Normal    Psych:   Psychiatric History anxiety depression          Physical Exam  General: Well nourished, Cooperative, Alert and Oriented    Airway:  Mallampati: II   Mouth Opening: Normal  TM Distance: Normal  Neck ROM: Normal ROM    Dental:  Intact        Anesthesia Plan  Type of Anesthesia, risks & benefits discussed:    Anesthesia Type: Gen ETT  Intra-op Monitoring Plan: Standard ASA Monitors  Post Op Pain Control Plan: multimodal analgesia and IV/PO Opioids PRN  Airway Plan: Direct, Post-Induction  Informed Consent: Informed consent signed with the Patient and all parties understand the risks and agree with anesthesia plan.  All questions answered.   ASA Score: 2  Day of Surgery Review of History & Physical: H&P Update referred to the surgeon/provider.    Ready For Surgery From Anesthesia Perspective.     .

## 2022-11-30 VITALS
OXYGEN SATURATION: 99 % | TEMPERATURE: 97 F | HEIGHT: 63 IN | HEART RATE: 93 BPM | RESPIRATION RATE: 20 BRPM | WEIGHT: 117 LBS | DIASTOLIC BLOOD PRESSURE: 61 MMHG | BODY MASS INDEX: 20.73 KG/M2 | SYSTOLIC BLOOD PRESSURE: 101 MMHG

## 2022-11-30 PROCEDURE — 94760 N-INVAS EAR/PLS OXIMETRY 1: CPT

## 2022-11-30 PROCEDURE — 94761 N-INVAS EAR/PLS OXIMETRY MLT: CPT

## 2022-11-30 PROCEDURE — 99900035 HC TECH TIME PER 15 MIN (STAT)

## 2022-11-30 PROCEDURE — 94799 UNLISTED PULMONARY SVC/PX: CPT

## 2022-11-30 PROCEDURE — 25000003 PHARM REV CODE 250: Performed by: SURGERY

## 2022-11-30 RX ADMIN — SERTRALINE HYDROCHLORIDE 50 MG: 50 TABLET ORAL at 09:11

## 2022-11-30 RX ADMIN — PANTOPRAZOLE SODIUM 40 MG: 40 TABLET, DELAYED RELEASE ORAL at 09:11

## 2022-11-30 RX ADMIN — OXYCODONE 5 MG: 5 TABLET ORAL at 12:11

## 2022-11-30 RX ADMIN — OXYCODONE 5 MG: 5 TABLET ORAL at 07:11

## 2022-11-30 NOTE — ANESTHESIA POSTPROCEDURE EVALUATION
Anesthesia Post Evaluation    Patient: Linda Sherwood    Procedure(s) Performed: Procedure(s) (LRB):  LAPAROSCOPY, DIAGNOSTIC (N/A)  LYSIS, ADHESIONS, LAPAROSCOPIC (N/A)    Final Anesthesia Type: general      Patient location during evaluation: PACU  Patient participation: Yes- Able to Participate  Level of consciousness: awake and alert  Post-procedure vital signs: reviewed and stable  Pain management: adequate  Airway patency: patent    PONV status at discharge: No PONV  Anesthetic complications: no      Cardiovascular status: hemodynamically stable  Respiratory status: unassisted and room air  Hydration status: euvolemic  Follow-up not needed.          Vitals Value Taken Time   /65 11/29/22 1855   Temp 36.5 °C (97.7 °F) 11/29/22 1825   Pulse 96 11/29/22 1856   Resp 11 11/29/22 1856   SpO2 100 % 11/29/22 1856   Vitals shown include unvalidated device data.      No case tracking events are documented in the log.      Pain/Silvia Score: Silvia Score: 8 (11/29/2022  6:45 PM)        
none

## 2022-11-30 NOTE — DISCHARGE SUMMARY
[facility] Surgery  Discharge Summary      Patient Name: Linda Sherwood  MRN: [id number]  Admission Date: 11/29/2022  Hospital Length of Stay: 0 days  Discharge Date and Time:  11/30/2022 7:37 AM  Attending Physician: Jamir Sher Jr.*   Discharging Provider: Jamir Sher Jr, MD  Primary Care Provider: Betsy Goodrich MD     HPI: 32-year-old female who underwent diagnostic laparoscopy with extensive lysis of adhesions for chronic abdominal pain and history of malrotation. Do the small bowel manipulation she was admitted to extended recovery to assure tolerance of p.o. and adequate pain control.    Procedure(s) (LRB):  LAPAROSCOPY, DIAGNOSTIC (N/A)  LYSIS, ADHESIONS, LAPAROSCOPIC (N/A)     Hospital Course: No acute events.  Tolerated clears to regular diet.  Pain was adequately controlled. Was felt stable to be discharged home    Discharge Exam:  NAD, AAOx3  Anicteric slcera  RRR  NLB  Abd soft, non distended, appropriate TTP, incisions intact    Consults:     Significant Diagnostic Studies: n/a    Pending Diagnostic Studies:       None          There are no hospital problems to display for this patient.     Discharged Condition: good    Disposition: Home or Self Care    Follow Up:    Patient Instructions:      Diet Adult Regular     Ice to affected area     Lifting restrictions   Order Comments: Please avoid lifting greater than 20 lb, straining, strenuous activity for two weeks.     Change dressing (specify)   Order Comments: Post-Operative Wound Care    A surgical glue has been placed over your incisions, please leave the glue in place and do not attempt to remove it.  It is ok to shower using mild soap and water over the incisions the day after your procedure. Pat dry your incisions. Do not soak in a bathtub or other body of water for 2 weeks or until cleared by your surgeon.     If you noticed redness, swelling, fever, increasing pain or significant drainage from  your wound please call/message the office or the 24 hr nurse hotline after hours.     Notify your health care provider if you experience any of the following:  temperature >100.4     Notify your health care provider if you experience any of the following:  persistent nausea and vomiting or diarrhea     Notify your health care provider if you experience any of the following:  severe uncontrolled pain     Notify your health care provider if you experience any of the following:  redness, tenderness, or signs of infection (pain, swelling, redness, odor or green/yellow discharge around incision site)     Notify your health care provider if you experience any of the following:  worsening rash     Notify your health care provider if you experience any of the following:  increased confusion or weakness     Shower on day dressing removed (No bath)     Medications:  Reconciled Home Medications:      Medication List        START taking these medications      HYDROcodone-acetaminophen 5-325 mg per tablet  Commonly known as: NORCO  Take 1 tablet by mouth every 6 (six) hours as needed for Pain.     ibuprofen 200 MG tablet  Commonly known as: ADVIL,MOTRIN  Take 3 tablets (600 mg total) by mouth every 8 (eight) hours. for 5 days            CONTINUE taking these medications      dicyclomine 20 mg tablet  Commonly known as: BENTYL  Take 20 mg by mouth every 6 (six) hours.     * lisdexamfetamine 20 MG capsule  Commonly known as: VYVANSE  Take 1 capsule (20 mg total) by mouth every morning.     * lisdexamfetamine 30 MG capsule  Commonly known as: VYVANSE  Take 1 capsule (30 mg total) by mouth every morning.     omeprazole 40 MG capsule  Commonly known as: PRILOSEC  Take 1 capsule (40 mg total) by mouth before breakfast.     sertraline 50 MG tablet  Commonly known as: ZOLOFT  Take 1 tablet (50 mg total) by mouth once daily.           * This list has 2 medication(s) that are the same as other medications prescribed for you. Read the  directions carefully, and ask your doctor or other care provider to review them with you.                  Jamir Sher Jr, MD  General Surgery  [facility]

## 2022-11-30 NOTE — NURSING
Patient discharge accompanied by parent. Escorted out via wheelchair. Written and verbal discharge instructions provided. Patient verbalized understanding.

## 2022-11-30 NOTE — PLAN OF CARE
Problem: Adult Inpatient Plan of Care  Goal: Plan of Care Review  Outcome: Met  Goal: Patient-Specific Goal (Individualized)  Outcome: Met  Goal: Absence of Hospital-Acquired Illness or Injury  Outcome: Met  Goal: Optimal Comfort and Wellbeing  Outcome: Met  Goal: Readiness for Transition of Care  Outcome: Met   Plan of care reviewed with patient. Patient adequate for discharge.

## 2022-11-30 NOTE — CARE UPDATE
11/29/22 2102   Patient Assessment/Suction   Level of Consciousness (AVPU) responds to voice   Respiratory Effort Unlabored   Expansion/Accessory Muscles/Retractions no retractions;no use of accessory muscles   PRE-TX-O2   O2 Device (Oxygen Therapy) room air   SpO2 96 %   Pulse Oximetry Type Intermittent   $ Pulse Oximetry - Multiple Charge Pulse Oximetry - Multiple   Pulse 85   Resp 16   Incentive Spirometer   $ Incentive Spirometer Charges done with encouragement;proper technique demonstrated   Administration (IS) proper technique demonstrated   Number of Repetitions (IS) 10   Level Incentive Spirometer (mL) 2500   Patient Tolerance (IS) good

## 2022-11-30 NOTE — OP NOTE
DATE OF PROCEDURE: 11/29/2022    PREOPERATIVE DIAGNOSIS:   1. Malrotation of the intestines  2. Chronic abdominal pain    POSTOPERATIVE DIAGNOSIS:   1. Malrotation of intestines  2. Chronic abdominal pain  3. Extensive interloop adhesions    PROCEDURE: Diagnostic laparoscopy with lysis of adhesions    SURGEON: Jamir Sher M.D    ASSISTANT: Estela FRIEND    ANESTHESIA: General    ESTIMATED BLOOD LOSS: 30 cc    SPECIMEN: None    CONDITION: Stable    COMPLICATIONS: None    FINDINGS:   1. Malrotation of the intestines with the small bowel located in the right upper lateral aspect of the abdominal cavity, large intestines located mostly on the left side and inferior aspect of the abdominal cavity  2. 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 Has chronic abdominal pain with extensive workup with no definitive etiology identified. Patient was counseled on options and agreed proceed with diagnostic laparoscopy for possible takedown of residual [place]'s bands were intra-abdominal adhesions.    PROCEDURE IN DETAIL: Patient taken operating room placed in supine position where general endotracheal intubation was achieved.  Serrano catheter was placed to decompress the bladder removed at the end of the case.  Arms were left extended.  Her abdomen was prepped and draped typical sterile fashion.  Time-out performed by members of the operative team. She did receive perioperative antibiotics. A Veress needle was inserted in the right upper quadrant and attached insufflation. We had appropriate initial pressures and pneumoperitoneum was achieved. 5 mm trocar was introduced after injecting local  anesthetic in the right lower lateral abdomen.  Our entry site was without injury.  A Veress site was examined without injury. Two additional 5 mm trocars were placed in the suprapubic and left lower lateral abdomen after injecting local anesthetic.  Examination of abdominal cavity initially revealed no significant inflammatory changes. Her anatomy was obviously abnormal with majority of the colon in the left and inferior aspect of the abdominal cavity in the majority of her small intestine in the right and upper aspect. First identified the stomach and then worked distally.  There was significant adhesions of the proximal small bowel to itself and to the gallbladder and liver.  There was also a knuckle which appeared to be a possible closed loop obstruction though there was no significant dilatation or bowel compromise. We performed extensive lysis of adhesions mostly using scissors but occasionally using blunt dissection and the LigaSure device. We worked from proximal to distal.  The majority of the 1/3 of the small bowel was affected by adhesions.  The mid small bowel was relatively freed adhesions however we continued distally there were loops of small bowel still adherent to itself and portion deeply adherent to the colon which could not safely be taken down.  Again there was no severe angulation at this site or bowel changes such as dilatation or inflammation so this was left in place. We then we ran the small bowel.  There were no obvious full-thickness injuries or significant injuries requiring repair the course a missed enterotomy could have occurred. We did have minor bleeding which was controlled with electrocautery and blood was irrigated and suctioned clean. This occurred a small part of mesentery on the small bowel and the bowel remained viable.  The colon was looped in his exact fashion on itself from the right lower quadrant up to the left in the back and down.  These were adherent to itself but  again without any significant angulation or areas of dilatation. I did not attempt to take down any of these colonic to colonic adhesions. We then ran small bowel again. We felt we would performed adequate lysis of adhesions to improve hopefully her symptoms.  Again no obvious enterotomy was identified. We then suctioned the abdominal cavity again. We assured adequate counts. We injected additional local anesthetic. We relieved the abdomen pneumoperitoneum and removed our trocars.  The skin incisions were closed with 4-0 Monocryl subcuticular stitches.  The skin was closed with Dermabond.  Serrano catheter was removed. Patient was aroused from sedation, extubated taken to recovery room stable condition have suffered no complications.  All counts were correct x2 the case.  I was present scrubbed throughout all operative portions of the case except for final skin closure.    DISPO: PACU then admit for extended recovery due to extensive lysis of adhesions and possible development of ileus

## 2022-11-30 NOTE — TRANSFER OF CARE
"Anesthesia Transfer of Care Note    Patient: Linda Sherwood    Procedure(s) Performed: Procedure(s) (LRB):  LAPAROSCOPY, DIAGNOSTIC (N/A)  LYSIS, ADHESIONS, LAPAROSCOPIC (N/A)    Patient location: PACU    Anesthesia Type: general    Transport from OR: Transported from OR on 2-3 L/min O2 by NC with adequate spontaneous ventilation    Post pain: adequate analgesia    Post assessment: no apparent anesthetic complications and tolerated procedure well    Post vital signs: stable    Level of consciousness: sedated    Nausea/Vomiting: no nausea/vomiting    Complications: none    Transfer of care protocol was followed      Last vitals:   Visit Vitals  /64 (BP Location: Right arm, Patient Position: Lying)   Pulse 89   Temp 36.6 °C (97.9 °F) (Temporal)   Resp 17   Ht 5' 3" (1.6 m)   Wt 53.1 kg (117 lb)   LMP 11/11/2022   SpO2 100%   Breastfeeding No   BMI 20.73 kg/m²     "

## 2022-11-30 NOTE — PLAN OF CARE
Pt clear for DC from case management standpoint. Discharging to home       11/30/22 1048   Final Note   Assessment Type Final Discharge Note   Anticipated Discharge Disposition Home   Hospital Resources/Appts/Education Provided Appointments scheduled and added to AVS

## 2022-11-30 NOTE — PLAN OF CARE
[facility]  Initial Discharge Assessment       Primary Care Provider: Betsy Goodrich MD    Admission Diagnosis: Malrotation of intestine [Q43.3]    Admission Date: 11/29/2022  Expected Discharge Date: 11/30/2022    Discharge Barriers Identified: None    Payor: BLUE CROSS [facility] / Plan: BCBS ALL OUT OF STATE / Product Type: PPO /     Extended Emergency Contact Information  Primary Emergency Contact: Mitchell Sherwood   [place]  Mobile Phone: [phone]  Relation: Spouse    Discharge Plan A: Home with family  Discharge Plan B: Home      [id number] [place] - [address]  [address]  [facility] [address]  Phone: [phone] Fax: [phone]      Completed DC assessment with pt and her mother at bedside. Verified information on facesheet as correct. Pt lives at listed address with her spouse and 3 dependent children. Denies hh/hd/dme/blood thinners. Independent at baseline. Drives herself to apts. Reports that her mother will provide transportation home upon DC. Verified PCP as Dr. Goodrich. [facility] [place] [facility] [address] Denies recent inpt stay in last 30 days. Reports being compliant with medications. DC plan is home.   Initial Assessment (most recent)       Adult Discharge Assessment - 11/30/22 1043          Discharge Assessment    Assessment Type Discharge Planning Assessment     Confirmed/corrected address, phone number and insurance Yes     Confirmed Demographics Correct on Facesheet     Source of Information patient;family     Communicated VISHNU with patient/caregiver Yes     Reason For Admission planned sx     Lives With child(jennifer), dependent;spouse     Facility Arrived From: home     Do you expect to return to your current living situation? No     Prior to hospitilization cognitive status: Alert/Oriented     Current cognitive status: Alert/Oriented     Walking or Climbing Stairs Difficulty none     Dressing/Bathing Difficulty none      Equipment Currently Used at Home none     Readmission within 30 days? No     Patient currently being followed by outpatient case management? No     Do you currently have service(s) that help you manage your care at home? No     Do you take prescription medications? Yes     Do you have prescription coverage? Yes     Coverage BCBS     Do you have any problems affording any of your prescribed medications? No     Is the patient taking medications as prescribed? yes     Who is going to help you get home at discharge? mother     How do you get to doctors appointments? car, drives self;family or friend will provide     Are you on dialysis? No     Do you take coumadin? No     Discharge Plan A Home with family     Discharge Plan B Home     DME Needed Upon Discharge  none     Discharge Plan discussed with: Patient;Parent(s)     Discharge Barriers Identified None        Physical Activity    On average, how many days per week do you engage in moderate to strenuous exercise (like a brisk walk)? 0 days     On average, how many minutes do you engage in exercise at this level? 0 min        Financial Resource Strain    How hard is it for you to pay for the very basics like food, housing, medical care, and heating? Not hard at all        Housing Stability    In the last 12 months, was there a time when you were not able to pay the mortgage or rent on time? No     In the last 12 months, how many places have you lived? 1     In the last 12 months, was there a time when you did not have a steady place to sleep or slept in a shelter (including now)? No        Transportation Needs    In the past 12 months, has lack of transportation kept you from medical appointments or from getting medications? No     In the past 12 months, has lack of transportation kept you from meetings, work, or from getting things needed for daily living? No        Food Insecurity    Within the past 12 months, you worried that your food would run out before you got  the money to buy more. Never true     Within the past 12 months, the food you bought just didn't last and you didn't have money to get more. Never true        Stress    Do you feel stress - tense, restless, nervous, or anxious, or unable to sleep at night because your mind is troubled all the time - these days? Rather much        Social Connections    In a typical week, how many times do you talk on the phone with family, friends, or neighbors? Three times a week     How often do you get together with friends or relatives? Once a week     How often do you attend Hinduism or Hoahaoism services? Never     Do you belong to any clubs or organizations such as Hinduism groups, unions, fraternal or athletic groups, or school groups? No     How often do you attend meetings of the clubs or organizations you belong to? Never     Are you , , , , never , or living with a partner?         Alcohol Use    Q1: How often do you have a drink containing alcohol? 2-4 times a month     Q2: How many drinks containing alcohol do you have on a typical day when you are drinking? 1 or 2     Q3: How often do you have six or more drinks on one occasion? Never

## 2022-12-05 ENCOUNTER — PATIENT MESSAGE (OUTPATIENT)
Dept: FAMILY MEDICINE | Facility: CLINIC | Age: 32
End: 2022-12-05
Payer: COMMERCIAL

## 2022-12-05 DIAGNOSIS — Z02.0 ENCOUNTER FOR SCHOOL HISTORY AND PHYSICAL EXAMINATION: Primary | ICD-10-CM

## 2022-12-08 ENCOUNTER — PATIENT MESSAGE (OUTPATIENT)
Dept: FAMILY MEDICINE | Facility: CLINIC | Age: 32
End: 2022-12-08
Payer: COMMERCIAL

## 2022-12-08 ENCOUNTER — LAB VISIT (OUTPATIENT)
Dept: LAB | Facility: HOSPITAL | Age: 32
End: 2022-12-08
Attending: STUDENT IN AN ORGANIZED HEALTH CARE EDUCATION/TRAINING PROGRAM
Payer: COMMERCIAL

## 2022-12-08 DIAGNOSIS — Z02.0 ENCOUNTER FOR SCHOOL HISTORY AND PHYSICAL EXAMINATION: ICD-10-CM

## 2022-12-08 PROCEDURE — 86480 TB TEST CELL IMMUN MEASURE: CPT | Performed by: STUDENT IN AN ORGANIZED HEALTH CARE EDUCATION/TRAINING PROGRAM

## 2022-12-10 LAB
GAMMA INTERFERON BACKGROUND BLD IA-ACNC: 0.07 IU/ML
M TB IFN-G CD4+ BCKGRND COR BLD-ACNC: 0.01 IU/ML
MITOGEN IGNF BCKGRD COR BLD-ACNC: 9.93 IU/ML
TB GOLD PLUS: NEGATIVE
TB2 - NIL: 0.01 IU/ML

## 2022-12-12 ENCOUNTER — OFFICE VISIT (OUTPATIENT)
Dept: SURGERY | Facility: CLINIC | Age: 32
End: 2022-12-12
Payer: COMMERCIAL

## 2022-12-12 VITALS
HEIGHT: 63 IN | WEIGHT: 117.06 LBS | TEMPERATURE: 98 F | BODY MASS INDEX: 20.74 KG/M2 | HEART RATE: 78 BPM | DIASTOLIC BLOOD PRESSURE: 76 MMHG | SYSTOLIC BLOOD PRESSURE: 111 MMHG | RESPIRATION RATE: 16 BRPM

## 2022-12-12 DIAGNOSIS — Z98.890 STATUS POST LAPAROSCOPIC SURGERY: Primary | ICD-10-CM

## 2022-12-12 PROCEDURE — 3074F SYST BP LT 130 MM HG: CPT | Mod: CPTII,S$GLB,, | Performed by: SURGERY

## 2022-12-12 PROCEDURE — 99024 PR POST-OP FOLLOW-UP VISIT: ICD-10-PCS | Mod: S$GLB,,, | Performed by: SURGERY

## 2022-12-12 PROCEDURE — 99024 POSTOP FOLLOW-UP VISIT: CPT | Mod: S$GLB,,, | Performed by: SURGERY

## 2022-12-12 PROCEDURE — 3078F PR MOST RECENT DIASTOLIC BLOOD PRESSURE < 80 MM HG: ICD-10-PCS | Mod: CPTII,S$GLB,, | Performed by: SURGERY

## 2022-12-12 PROCEDURE — 3008F PR BODY MASS INDEX (BMI) DOCUMENTED: ICD-10-PCS | Mod: CPTII,S$GLB,, | Performed by: SURGERY

## 2022-12-12 PROCEDURE — 3074F PR MOST RECENT SYSTOLIC BLOOD PRESSURE < 130 MM HG: ICD-10-PCS | Mod: CPTII,S$GLB,, | Performed by: SURGERY

## 2022-12-12 PROCEDURE — 3008F BODY MASS INDEX DOCD: CPT | Mod: CPTII,S$GLB,, | Performed by: SURGERY

## 2022-12-12 PROCEDURE — 3078F DIAST BP <80 MM HG: CPT | Mod: CPTII,S$GLB,, | Performed by: SURGERY

## 2022-12-12 NOTE — PROGRESS NOTES
GENERAL SURGERY  POST-OP PROGRESS NOTE    HPI: Linda Sherwood is a 32 y.o. female with a history of intestinal malrotation status post diagnostic laparoscopy with extensive lysis of adhesions for chronic abdominal pain. Since surgery the patient reports pain is much improved.  Bowel function has improved as well. She had 1 episode of severe vomiting which occurred after drinking a small amount of peppermint milkshake.  No further symptoms since that time. No fevers or chills.  Incisions healing without issues.    VITALS:  Vitals:    12/12/22 1414   BP: 111/76   Pulse: 78   Resp: 16   Temp: 98.4 °F (36.9 °C)       PHYSICAL EXAM:  Abdomen soft without distention, lower abdominal port incisions well-healed without signs of infection      ASSESSMENT & PLAN:  32 y.o. female s/p diagnostic laparoscopy with extensive lysis of adhesions for chronic abdominal pain  -majority of symptoms have improved since surgery  -episode of emesis of unknown significance, recommend continued observation  -if vomiting or abdominal pain becomes more frequent instructed the patient to contact the office  -otherwise return to clinic p.r.n.       Tdap; 28-Sep-2021 21:47; [place] Stark (BRYCE); [employer]; [id number] (Exp. Date: 15-Jul-2023); IntraMuscular; Deltoid Right.; 0.5 milliLiter(s); VIS (VIS Published: 09-May-2013, VIS Presented: 28-Sep-2021);

## 2023-01-09 ENCOUNTER — PATIENT OUTREACH (OUTPATIENT)
Dept: ADMINISTRATIVE | Facility: HOSPITAL | Age: 33
End: 2023-01-09
Payer: COMMERCIAL

## 2023-01-09 NOTE — LETTER
AUTHORIZATION FOR RELEASE OF   CONFIDENTIAL INFORMATION    Dear Marta Mcgee MD,    We are seeing Linda Sherwood, date of birth 1990, in the clinic at [facility]. Betsy Goodrich MD is the patient's PCP. Linda Sherwood has an outstanding lab/procedure at the time we reviewed her chart. In order to help keep her health information updated, she has authorized us to request the following medical record(s):            (X)  PAP SMEAR                                                   Please fax records to [facility], Erica C Sjunnesen, MD, [phone]    If you have any questions, please contact Benjamín Parra LPN Care Coordinator  at [phone].            Patient Name: Linda Sherwood  : 1990  Patient Phone #: [phone]

## 2023-01-09 NOTE — PROGRESS NOTES
Non-compliant report chart audits for CERVICAL CANCER SCREENING. Chart review completed for HM test overdue (mammograms, Colonoscopies, pap smears, DM labs, and/or EYE EXAMs)      Care Everywhere and media, updates requested and reviewed.      [facility] and [employer] reviewed.      Outreach to patient in reference to cervical cancer screening.    Pap requested from Marta Mcgee MD      Outreach:  Cervical cancer screening

## 2023-01-13 ENCOUNTER — PATIENT OUTREACH (OUTPATIENT)
Dept: ADMINISTRATIVE | Facility: HOSPITAL | Age: 33
End: 2023-01-13
Payer: COMMERCIAL

## 2023-01-13 ENCOUNTER — OFFICE VISIT (OUTPATIENT)
Dept: FAMILY MEDICINE | Facility: CLINIC | Age: 33
End: 2023-01-13
Payer: COMMERCIAL

## 2023-01-13 VITALS
DIASTOLIC BLOOD PRESSURE: 74 MMHG | BODY MASS INDEX: 21.13 KG/M2 | RESPIRATION RATE: 14 BRPM | SYSTOLIC BLOOD PRESSURE: 118 MMHG | HEART RATE: 71 BPM | WEIGHT: 119.25 LBS | HEIGHT: 63 IN

## 2023-01-13 DIAGNOSIS — L01.00 IMPETIGO: ICD-10-CM

## 2023-01-13 DIAGNOSIS — B37.31 VAGINAL YEAST INFECTION: ICD-10-CM

## 2023-01-13 DIAGNOSIS — F90.9 ATTENTION DEFICIT DISORDER WITH HYPERACTIVITY: ICD-10-CM

## 2023-01-13 DIAGNOSIS — L23.7 POISON IVY DERMATITIS: Primary | ICD-10-CM

## 2023-01-13 PROCEDURE — 1160F PR REVIEW ALL MEDS BY PRESCRIBER/CLIN PHARMACIST DOCUMENTED: ICD-10-PCS | Mod: CPTII,S$GLB,, | Performed by: STUDENT IN AN ORGANIZED HEALTH CARE EDUCATION/TRAINING PROGRAM

## 2023-01-13 PROCEDURE — 1159F MED LIST DOCD IN RCRD: CPT | Mod: CPTII,S$GLB,, | Performed by: STUDENT IN AN ORGANIZED HEALTH CARE EDUCATION/TRAINING PROGRAM

## 2023-01-13 PROCEDURE — 3008F BODY MASS INDEX DOCD: CPT | Mod: CPTII,S$GLB,, | Performed by: STUDENT IN AN ORGANIZED HEALTH CARE EDUCATION/TRAINING PROGRAM

## 2023-01-13 PROCEDURE — 1160F RVW MEDS BY RX/DR IN RCRD: CPT | Mod: CPTII,S$GLB,, | Performed by: STUDENT IN AN ORGANIZED HEALTH CARE EDUCATION/TRAINING PROGRAM

## 2023-01-13 PROCEDURE — 96372 PR INJECTION,THERAP/PROPH/DIAG2ST, IM OR SUBCUT: ICD-10-PCS | Mod: S$GLB,,, | Performed by: STUDENT IN AN ORGANIZED HEALTH CARE EDUCATION/TRAINING PROGRAM

## 2023-01-13 PROCEDURE — 3008F PR BODY MASS INDEX (BMI) DOCUMENTED: ICD-10-PCS | Mod: CPTII,S$GLB,, | Performed by: STUDENT IN AN ORGANIZED HEALTH CARE EDUCATION/TRAINING PROGRAM

## 2023-01-13 PROCEDURE — 99999 PR PBB SHADOW E&M-EST. PATIENT-LVL III: ICD-10-PCS | Mod: PBBFAC,,, | Performed by: STUDENT IN AN ORGANIZED HEALTH CARE EDUCATION/TRAINING PROGRAM

## 2023-01-13 PROCEDURE — 1159F PR MEDICATION LIST DOCUMENTED IN MEDICAL RECORD: ICD-10-PCS | Mod: CPTII,S$GLB,, | Performed by: STUDENT IN AN ORGANIZED HEALTH CARE EDUCATION/TRAINING PROGRAM

## 2023-01-13 PROCEDURE — 96372 THER/PROPH/DIAG INJ SC/IM: CPT | Mod: S$GLB,,, | Performed by: STUDENT IN AN ORGANIZED HEALTH CARE EDUCATION/TRAINING PROGRAM

## 2023-01-13 PROCEDURE — 99999 PR PBB SHADOW E&M-EST. PATIENT-LVL III: CPT | Mod: PBBFAC,,, | Performed by: STUDENT IN AN ORGANIZED HEALTH CARE EDUCATION/TRAINING PROGRAM

## 2023-01-13 PROCEDURE — 3074F PR MOST RECENT SYSTOLIC BLOOD PRESSURE < 130 MM HG: ICD-10-PCS | Mod: CPTII,S$GLB,, | Performed by: STUDENT IN AN ORGANIZED HEALTH CARE EDUCATION/TRAINING PROGRAM

## 2023-01-13 PROCEDURE — 99214 PR OFFICE/OUTPT VISIT, EST, LEVL IV, 30-39 MIN: ICD-10-PCS | Mod: 25,S$GLB,, | Performed by: STUDENT IN AN ORGANIZED HEALTH CARE EDUCATION/TRAINING PROGRAM

## 2023-01-13 PROCEDURE — 99214 OFFICE O/P EST MOD 30 MIN: CPT | Mod: 25,S$GLB,, | Performed by: STUDENT IN AN ORGANIZED HEALTH CARE EDUCATION/TRAINING PROGRAM

## 2023-01-13 PROCEDURE — 3078F PR MOST RECENT DIASTOLIC BLOOD PRESSURE < 80 MM HG: ICD-10-PCS | Mod: CPTII,S$GLB,, | Performed by: STUDENT IN AN ORGANIZED HEALTH CARE EDUCATION/TRAINING PROGRAM

## 2023-01-13 PROCEDURE — 3078F DIAST BP <80 MM HG: CPT | Mod: CPTII,S$GLB,, | Performed by: STUDENT IN AN ORGANIZED HEALTH CARE EDUCATION/TRAINING PROGRAM

## 2023-01-13 PROCEDURE — 3074F SYST BP LT 130 MM HG: CPT | Mod: CPTII,S$GLB,, | Performed by: STUDENT IN AN ORGANIZED HEALTH CARE EDUCATION/TRAINING PROGRAM

## 2023-01-13 RX ORDER — SULFAMETHOXAZOLE AND TRIMETHOPRIM 800; 160 MG/1; MG/1
1 TABLET ORAL 2 TIMES DAILY
Qty: 14 TABLET | Refills: 0 | Status: SHIPPED | OUTPATIENT
Start: 2023-01-13 | End: 2023-01-20

## 2023-01-13 RX ORDER — LISDEXAMFETAMINE DIMESYLATE 30 MG/1
30 CAPSULE ORAL EVERY MORNING
Qty: 30 CAPSULE | Refills: 0 | Status: SHIPPED | OUTPATIENT
Start: 2023-03-14 | End: 2023-04-16 | Stop reason: SDUPTHER

## 2023-01-13 RX ORDER — FLUCONAZOLE 150 MG/1
150 TABLET ORAL DAILY
Qty: 1 TABLET | Refills: 0 | Status: SHIPPED | OUTPATIENT
Start: 2023-01-13 | End: 2023-01-14

## 2023-01-13 RX ORDER — MUPIROCIN 20 MG/G
OINTMENT TOPICAL 3 TIMES DAILY
Qty: 30 G | Refills: 2 | Status: SHIPPED | OUTPATIENT
Start: 2023-01-13 | End: 2023-01-20

## 2023-01-13 RX ORDER — HYDROXYZINE HYDROCHLORIDE 25 MG/1
25 TABLET, FILM COATED ORAL NIGHTLY PRN
Qty: 7 TABLET | Refills: 0 | Status: SHIPPED | OUTPATIENT
Start: 2023-01-13 | End: 2023-01-20

## 2023-01-13 RX ORDER — TRIAMCINOLONE ACETONIDE 40 MG/ML
40 INJECTION, SUSPENSION INTRA-ARTICULAR; INTRAMUSCULAR
Status: COMPLETED | OUTPATIENT
Start: 2023-01-13 | End: 2023-01-13

## 2023-01-13 RX ORDER — PREDNISONE 10 MG/1
TABLET ORAL
Qty: 63 TABLET | Refills: 0 | Status: SHIPPED | OUTPATIENT
Start: 2023-01-14 | End: 2023-01-31

## 2023-01-13 RX ORDER — CLOBETASOL PROPIONATE 0.5 MG/G
OINTMENT TOPICAL 2 TIMES DAILY
Qty: 60 G | Refills: 2 | Status: SHIPPED | OUTPATIENT
Start: 2023-01-13 | End: 2023-09-12

## 2023-01-13 RX ORDER — BETAMETHASONE SODIUM PHOSPHATE AND BETAMETHASONE ACETATE 3; 3 MG/ML; MG/ML
6 INJECTION, SUSPENSION INTRA-ARTICULAR; INTRALESIONAL; INTRAMUSCULAR; SOFT TISSUE
Status: COMPLETED | OUTPATIENT
Start: 2023-01-13 | End: 2023-01-13

## 2023-01-13 RX ORDER — LISDEXAMFETAMINE DIMESYLATE 30 MG/1
30 CAPSULE ORAL EVERY MORNING
Qty: 30 CAPSULE | Refills: 0 | Status: SHIPPED | OUTPATIENT
Start: 2023-02-12 | End: 2023-03-14

## 2023-01-13 RX ORDER — LISDEXAMFETAMINE DIMESYLATE 30 MG/1
30 CAPSULE ORAL EVERY MORNING
Qty: 30 CAPSULE | Refills: 0 | Status: SHIPPED | OUTPATIENT
Start: 2023-01-13 | End: 2023-02-22 | Stop reason: SDUPTHER

## 2023-01-13 RX ADMIN — BETAMETHASONE SODIUM PHOSPHATE AND BETAMETHASONE ACETATE 6 MG: 3; 3 INJECTION, SUSPENSION INTRA-ARTICULAR; INTRALESIONAL; INTRAMUSCULAR; SOFT TISSUE at 03:01

## 2023-01-13 RX ADMIN — TRIAMCINOLONE ACETONIDE 40 MG: 40 INJECTION, SUSPENSION INTRA-ARTICULAR; INTRAMUSCULAR at 03:01

## 2023-01-13 NOTE — PROGRESS NOTES
Subjective:      Patient ID: Linda Sherwood is a 32 y.o. female    Chief Complaint   Patient presents with    Rash     HPI  32 y.o. female with a PMHx as documented below presents to clinic today for the following:    Rash: Patient reports poison ivy contact with subsequent dermatitis for the past week and a half with symptoms becoming more severe. Pt reports going to an urgent care about 5 days ago where she received a steroid shot and prednisone 5 mg daily x5 days. Pt reports progressive worsening of symptoms despite treatment. No associated fever, chills, N/V, diarrhea, urinary symptoms.     ADD/ADHD: Pt doing well on the current medication dose/regimen. Reports medication helps with concentration and attention. Denies side effects on the current dose of medication including sleep disturbance, weight loss, significant appetite suppression, anxiety, emotional lability, agitation, and palpitations. PDMP reviewed and no aberrant or unexpected prescriptions.    *Pt requests diflucan if antibiotics needed due to history of vaginal yeast infections with systemic antibiotic use.     Past Medical History:   Diagnosis Date    Anxiety     Attention deficit disorder with hyperactivity 01/15/2018    Chronic bilateral low back pain without sciatica 03/22/2018    Colon polyp     Depression     Diverticulitis     Diverticulosis     Gastritis     GERD (gastroesophageal reflux disease) 04/12/2018      Review of Systems   Constitutional:  Negative for fever.   HENT:  Negative for congestion and sore throat.    Eyes:  Negative for pain.   Respiratory:  Negative for cough and shortness of breath.    Gastrointestinal:  Negative for diarrhea and vomiting.   Musculoskeletal:  Negative for joint pain.   Skin:  Positive for rash.     Answers submitted by the patient for this visit:  Rash Questionnaire (Submitted on 1/12/2023)  Chief Complaint: Rash  Chronicity: new  Onset: in the past 7 days  Progression since onset: gradually  "worsening  Affected locations: neck, torso, abdomen, left ear, left arm, left hand, left wrist, left fingers, left hip, left upper leg, left leg, left ankle, right arm, right wrist, right fingers, right upper leg, right leg  Characteristics: blistering, redness, swelling, itchiness  Exposed to: plant contact  anorexia: No  facial edema: No  fatigue: No  nail changes: No  rhinorrhea: No  Treatments tried: anti-itch cream, antihistamine, topical steroids, oral steroids  Improvement on treatment: no relief  asthma: No  allergies: No  eczema: No  varicella: Yes    Objective:      Vitals:    01/13/23 1503   BP: 118/74   BP Location: Left arm   Patient Position: Sitting   BP Method: Medium (Manual)   Pulse: 71   Resp: 14   Weight: 54.1 kg (119 lb 4.3 oz)   Height: 5' 3" (1.6 m)     Physical Exam  Vitals reviewed.   Constitutional:       General: She is not in acute distress.  HENT:      Head: Normocephalic and atraumatic.   Cardiovascular:      Rate and Rhythm: Normal rate.   Pulmonary:      Effort: Pulmonary effort is normal. No respiratory distress.   Skin:     Comments: Raised, erythematous, vesicular lesions scattered across body (trunk and all four extremities) with some lesions coalescing and in a linear pattern and at different stages of healing. Some lesions with honey-crusted surface on the hands and left ankle.    Neurological:      General: No focal deficit present.      Mental Status: She is alert and oriented to person, place, and time. Mental status is at baseline.      Assessment:       1. Poison ivy dermatitis    2. Attention deficit disorder with hyperactivity    3. Impetigo    4. Vaginal yeast infection      Plan:       Linda was seen today for rash.    Diagnoses and all orders for this visit:    Poison ivy dermatitis:  -     predniSONE (DELTASONE) 10 MG tablet; Take prednisone 60 mg (6 tablets) daily x3 days, prednisone 50 mg (5 tablets) daily x3 days, prednisone 40 mg (4 tablets) daily x3 days, " prednisone 30 mg (3 tablets) daily x3 days, prednisone 20 mg (2 tablets) daily x3 days, prednisone 10 mg (1 tablet) daily x3 days (18 days total).  -     betamethasone acetate-betamethasone sodium phosphate injection 6 mg  -     triamcinolone acetonide injection 40 mg  -     hydrOXYzine HCL (ATARAX) 25 MG tablet; Take 1 tablet (25 mg total) by mouth nightly as needed for Itching.  -     clobetasol 0.05% (TEMOVATE) 0.05 % Oint; Apply topically 2 (two) times daily. Use on dry lesions prior to vesicle formation. for 7 days    Attention deficit disorder with hyperactivity:  -     lisdexamfetamine (VYVANSE) 30 MG capsule; Take 1 capsule (30 mg total) by mouth every morning.  -     lisdexamfetamine (VYVANSE) 30 MG capsule; Take 1 capsule (30 mg total) by mouth every morning.  -     lisdexamfetamine (VYVANSE) 30 MG capsule; Take 1 capsule (30 mg total) by mouth every morning.    Impetigo (secondary):  -     sulfamethoxazole-trimethoprim 800-160mg (BACTRIM DS) 800-160 mg Tab; Take 1 tablet by mouth 2 (two) times daily. for 7 days  -     mupirocin (BACTROBAN) 2 % ointment; Apply topically 3 (three) times daily. Apply to lesions with yellow crusting. for 7 days    Vaginal yeast infection: Pt requests diflucan if antibiotics needed due to history of vaginal yeast infections with systemic antibiotic use.   -     fluconazole (DIFLUCAN) 150 MG Tab; Take 1 tablet (150 mg total) by mouth once daily. for 1 day    Poison Ivy Dermatitis: symptomatic treatment (sooth and dry weeping lesions)  - Cool compresses for 15-20 minutes per hour  - Colloidal Oatmeal Bath (Aveeno bath)  - Calamine lotion applied several times per day  - Atarax at bedtime for itching may help with sleep    Patient reports doing well on the current dose of stimulant medications and denies significant adverse effects of this medication. Helping with concentration and attention. Refilled 3 months of this medication. Discussed that there would not be refills for lost  prescriptions, and early refills are not authorized in most instances. PDMP reviewed today, no aberrant fill pattern noted.      Follow up in about 3 months (around 4/13/2023), or if symptoms worsen or fail to improve.    Betsy Goodrich MD  [facility]  Office: [phone]   Fax: [phone]  01/13/2023      Disclaimer: This note was partly generated using dictation software which may occasionally result in transcription errors.

## 2023-01-13 NOTE — PATIENT INSTRUCTIONS
Symptomatic Treatment (sooth and dry weeping lesions)  - Cool compresses for 15-20 minutes per hour  - Colloidal Oatmeal Bath (Aveeno bath)  - Calamine lotion applied several times per day  - Atarax at bedtime for itching may help with sleep

## 2023-01-21 ENCOUNTER — TELEPHONE (OUTPATIENT)
Dept: GASTROENTEROLOGY | Facility: CLINIC | Age: 33
End: 2023-01-21
Payer: COMMERCIAL

## 2023-01-21 DIAGNOSIS — R11.0 NAUSEA: ICD-10-CM

## 2023-01-21 DIAGNOSIS — Z87.19 HISTORY OF GASTRITIS: ICD-10-CM

## 2023-01-23 ENCOUNTER — PATIENT MESSAGE (OUTPATIENT)
Dept: GASTROENTEROLOGY | Facility: CLINIC | Age: 33
End: 2023-01-23
Payer: COMMERCIAL

## 2023-01-23 RX ORDER — OMEPRAZOLE 40 MG/1
CAPSULE, DELAYED RELEASE ORAL
Qty: 30 CAPSULE | Refills: 0 | Status: SHIPPED | OUTPATIENT
Start: 2023-01-23 | End: 2023-02-20

## 2023-01-23 NOTE — TELEPHONE ENCOUNTER
Please inform the patient that I refilled the prescription for omeprazole and patient is due for a follow-up visit for continued evaluation and management.  Thanks  EDUIN

## 2023-02-17 ENCOUNTER — TREATMENT PLANNING (OUTPATIENT)
Dept: GASTROENTEROLOGY | Facility: CLINIC | Age: 33
End: 2023-02-17
Payer: COMMERCIAL

## 2023-02-17 NOTE — PROGRESS NOTES
"Reviewed medical records received from Dr. Garay, summarized below and in medical & surgical history (endoscopies, etc), copies made & given to nurse to be scanned into system:   9/28/2021 EGD & colonoscopy biopsy report: esophagus- mild chronic esophagitis; duodenum WNL, stomach- mild chronic gastritis, negative h pylori; TI WNL, random biopsies WNL, polyp- mixed adenomatous/hyperplastic polyp, biopsy at 35 cm ulcer with inflamed granulation tissue, rectum- unremarkable    10/22/2021 HIDA WNL with EF 39%  9/23/2021 abdominal ultrasound "impression no abdominal pathology identified"  Recommendations:  Continue with previous recommendations. Colonoscopy repeat in 5 years for surveillance (recall to be placed by nursing staff).        "

## 2023-04-04 ENCOUNTER — OFFICE VISIT (OUTPATIENT)
Dept: GASTROENTEROLOGY | Facility: CLINIC | Age: 33
End: 2023-04-04
Payer: COMMERCIAL

## 2023-04-04 VITALS — HEIGHT: 63 IN | WEIGHT: 121.94 LBS | BODY MASS INDEX: 21.61 KG/M2

## 2023-04-04 DIAGNOSIS — Z79.899 ENCOUNTER FOR MONITORING LONG-TERM PROTON PUMP INHIBITOR THERAPY: Primary | ICD-10-CM

## 2023-04-04 DIAGNOSIS — R19.7 INTERMITTENT DIARRHEA: ICD-10-CM

## 2023-04-04 DIAGNOSIS — Z87.19 HISTORY OF GASTROESOPHAGEAL REFLUX (GERD): ICD-10-CM

## 2023-04-04 DIAGNOSIS — Z51.81 ENCOUNTER FOR MONITORING LONG-TERM PROTON PUMP INHIBITOR THERAPY: Primary | ICD-10-CM

## 2023-04-04 DIAGNOSIS — Z87.19 HISTORY OF GASTRITIS: ICD-10-CM

## 2023-04-04 PROCEDURE — 3008F PR BODY MASS INDEX (BMI) DOCUMENTED: ICD-10-PCS | Mod: CPTII,S$GLB,, | Performed by: NURSE PRACTITIONER

## 2023-04-04 PROCEDURE — 99999 PR PBB SHADOW E&M-EST. PATIENT-LVL III: ICD-10-PCS | Mod: PBBFAC,,, | Performed by: NURSE PRACTITIONER

## 2023-04-04 PROCEDURE — 1160F PR REVIEW ALL MEDS BY PRESCRIBER/CLIN PHARMACIST DOCUMENTED: ICD-10-PCS | Mod: CPTII,S$GLB,, | Performed by: NURSE PRACTITIONER

## 2023-04-04 PROCEDURE — 3008F BODY MASS INDEX DOCD: CPT | Mod: CPTII,S$GLB,, | Performed by: NURSE PRACTITIONER

## 2023-04-04 PROCEDURE — 99214 OFFICE O/P EST MOD 30 MIN: CPT | Mod: S$GLB,,, | Performed by: NURSE PRACTITIONER

## 2023-04-04 PROCEDURE — 99214 PR OFFICE/OUTPT VISIT, EST, LEVL IV, 30-39 MIN: ICD-10-PCS | Mod: S$GLB,,, | Performed by: NURSE PRACTITIONER

## 2023-04-04 PROCEDURE — 1160F RVW MEDS BY RX/DR IN RCRD: CPT | Mod: CPTII,S$GLB,, | Performed by: NURSE PRACTITIONER

## 2023-04-04 PROCEDURE — 1159F MED LIST DOCD IN RCRD: CPT | Mod: CPTII,S$GLB,, | Performed by: NURSE PRACTITIONER

## 2023-04-04 PROCEDURE — 1159F PR MEDICATION LIST DOCUMENTED IN MEDICAL RECORD: ICD-10-PCS | Mod: CPTII,S$GLB,, | Performed by: NURSE PRACTITIONER

## 2023-04-04 PROCEDURE — 99999 PR PBB SHADOW E&M-EST. PATIENT-LVL III: CPT | Mod: PBBFAC,,, | Performed by: NURSE PRACTITIONER

## 2023-04-04 RX ORDER — OMEPRAZOLE 20 MG/1
20 CAPSULE, DELAYED RELEASE ORAL
Qty: 30 CAPSULE | Refills: 3 | Status: SHIPPED | OUTPATIENT
Start: 2023-04-04 | End: 2023-08-02 | Stop reason: SDUPTHER

## 2023-04-04 NOTE — PATIENT INSTRUCTIONS
"GERD (Adult)    The esophagus is a tube that carries food from the mouth to the stomach. A valve at the lower end of the esophagus prevents stomach acid from flowing upward. When this valve doesn't work properly, stomach contents may repeatedly flow back up (reflux) into the esophagus. This is called gastroesophageal reflux disease (GERD). GERD can irritate the esophagus. It can cause problems with swallowing or breathing. In severe cases, GERD can cause recurrent pneumonia or other serious problems.  Symptoms of reflux include burning, pressure or sharp pain in the upper abdomen or mid to lower chest. The pain can spread to the neck, back, or shoulder. There may be belching, an acid taste in the back of the throat, chronic cough, or sore throat or hoarseness. GERD symptoms often occur during the day after a big meal. They can also occur at night when lying down.   Home care  Lifestyle changes can help reduce symptoms. If needed, medicines may be prescribed. Symptoms often improve with treatment, but if treatment is stopped, the symptoms often return after a few months. So most persons with GERD will need to continue treatment.  Lifestyle changes  Limit or avoid fatty, fried, and spicy foods, as well as coffee, chocolate, mint, and foods with high acid content such as tomatoes and citrus fruit and juices (orange, grapefruit, lemon).  Dont eat large meals, especially at night. Frequent, smaller meals are best. Do not lie down right after eating. And dont eat anything 3 hours before going to bed.  Avoid drinking alcohol and smoking. As much as possible, stay away from second hand smoke.  If you are overweight, losing weight will reduce symptoms.   Avoid wearing tight clothing around your stomach area.  If your symptoms occur during sleep, use a foam wedge to elevate your upper body (not just your head.) Or, place 4" blocks under the head of your bed.  Medicines  If needed, medicines can help relieve the symptoms of " GERD and prevent damage to the esophagus. Discuss a medicine plan with your healthcare provider. This may include one or more of the following medicines:  Antacids to help neutralize the normal acids in your stomach.  Acid blockers (H2 blockers) to decrease acid production.  Acid inhibitors (PPIs) to decrease acid production in a different way than the blockers. They may work better, but can take a little longer to take effect.  Take an antacid 30-60 minutes after eating and at bedtime, but not at the same time as an acid blocker.  Try not to take medicines such as ibuprofen and aspirin. If you are taking aspirin for your heart or other medical reasons, talk to your healthcare provider about stopping it.  Follow-up care  Follow up with your healthcare provider or as advised by our staff.  When to seek medical advice  Call your healthcare provider if any of the following occur:  Stomach pain gets worse or moves to the lower right abdomen (appendix area)  Chest pain appears or gets worse, or spreads to the back, neck, shoulder, or arm  Frequent vomiting (cant keep down liquids)  Blood in the stool or vomit (red or black in color)  Feeling weak or dizzy  Fever of 100.4ºF (38ºC) or higher, or as directed by your healthcare provider  Date Last Reviewed: 6/23/2015  © 2420-1620 [employer]. [address] [place] [address], [place] [address]. All rights reserved. This information is not intended as a substitute for professional medical care. Always follow your healthcare professional's instructions.         Gastritis (Adult)    Gastritis is inflammation and irritation of the stomach lining. It can be present for a short time (acute) or be long lasting (chronic). Gastritis is often caused by infection with bacteria called H pylori. More than a third of people in the [place] have this bacteria in their bodies. In many cases, H pylori causes no problems or symptoms. In some people, though, the infection irritates the stomach  lining and causes gastritis. Other causes of stomach irritation include drinking alcohol or taking pain-relieving medicines called NSAIDs (such as aspirin or ibuprofen).   Symptoms of gastritis can include:  Abdominal pain or bloating  Loss of appetite  Nausea or vomiting  Vomiting blood or having black stools  Feeling more tired than usual  An inflamed and irritated stomach lining is more likely to develop a sore called an ulcer. To help prevent this, gastritis should be treated.  Home care  If needed, medicines may be prescribed. If you have H pylori infection, treating it will likely relieve your symptoms. Other changes can help reduce stomach irritation and help it heal.  If you have been prescribed medicines for H pylori infection, take them as directed. Take all of the medicine until it is finished or your healthcare provider tells you to stop, even if you feel better.  Your healthcare provider may recommend avoiding NSAIDs. If you take daily aspirin for your heart or other medical reasons, do not stop without talking to your healthcare provider first.  Avoid drinking alcohol.  Stop smoking. Smoking can irritate the stomach and delay healing. As much as possible, stay away from second hand smoke.  Follow-up care  Follow up with your healthcare provider, or as advised by our staff. Testing may be needed to check for inflammation or an ulcer.  When to seek medical advice  Call your healthcare provider for any of the following:  Stomach pain that gets worse or moves to the lower right abdomen (appendix area)  Chest pain that appears or gets worse, or spreads to the back, neck, shoulder, or arm  Frequent vomiting (cant keep down liquids)  Blood in the stool or vomit (red or black in color)  Feeling weak or dizzy  Fever of 100.4ºF (38ºC) or higher, or as directed by your healthcare provider  Date Last Reviewed: 6/22/2015  © 0917-7139 [employer]. [address] [place] [address], [place] [address]. All rights  reserved. This information is not intended as a substitute for professional medical care. Always follow your healthcare professional's instructions.      Uncertain Causes of Diarrhea (Adult)    Diarrhea is when stools are loose and watery. This can be caused by:  Viral infections  Bacterial infections  Food poisoning  Parasites  Irritable bowel syndrome (IBS)  Inflammatory bowel diseases such as ulcerative colitis, Crohn's disease, and celiac disease  Food intolerance, such as to lactose, the sugar found in milk and milk products  Reaction to medicines like antibiotics, laxatives, cancer drugs, and antacids  Along with diarrhea, you may also have:  Abdominal pain and cramping  Nausea and vomiting  Loss of bowel control  Fever and chills  Bloody stools  In some cases, antibiotics may help to treat diarrhea. You may have a stool sample test. This is done to see what is causing your diarrhea, and if antibiotics will help treat it. The results of a stool sample test may take up to 2 days. The healthcare provider may not give you antibiotics until he or she has the stool test results.  Diarrhea can cause dehydration. This is the loss of too much water and other fluids from the body. When this occurs, body fluid must be replaced. This can be done with oral rehydration solutions. Oral rehydration solutions are available at drugstores and grocery stores without a prescription.  Home care  Follow all instructions given by your healthcare provider. Rest at home for the next 24 hours, or until you feel better. Avoid caffeine, tobacco, and alcohol. These can make diarrhea, cramping, and pain worse.  If taking medicines:  Dont take over-the-counter diarrhea or nausea medicines unless your healthcare provider tells you to.  You may use acetaminophen or NSAID medicines like ibuprofen or naproxen to reduce pain and fever. Dont use these if you have chronic liver or kidney disease, or ever had a stomach ulcer or gastrointestinal  bleeding. Don't use NSAID medicines if you are already taking one for another condition (like arthritis) or are on daily aspirin therapy (such as for heart disease or after a stroke). Talk with your healthcare provider first.  If antibiotics were prescribed, be sure you take them until they are finished. Dont stop taking them even when you feel better. Antibiotics must be taken as a full course.  To prevent the spread of illness:  Remember that washing with soap and water and using alcohol-based  is the best way to prevent the spread of infection.  Clean the toilet after each use.  Wash your hands before eating.  Wash your hands before and after preparing food. Keep in mind that people with diarrhea or vomiting should not prepare food for others.  Wash your hands after using cutting boards, countertops, and knives that have been in contact with raw foods.  Wash and then peel fruits and vegetables.  Keep uncooked meats away from cooked and ready-to-eat foods.  Use a food thermometer when cooking. Cook poultry to at least 165°F (74°C). Cook ground meat (beef, veal, pork, lamb) to at least 160°F (71°C). Cook fresh beef, veal, lamb, and pork to at least 145°F (63°C).  Dont eat raw or undercooked eggs (poached or abdulaziz side up), poultry, meat, or unpasteurized milk and juices.  Food and drinks  The main goal while treating vomiting or diarrhea is to prevent dehydration. This is done by taking small amounts of liquids often.  Keep in mind that liquids are more important than food right now.  Drink only small amounts of liquids at a time.  Dont force yourself to eat, especially if you are having cramping, vomiting, or diarrhea. Dont eat large amounts at a time, even if you are hungry.  If you eat, avoid fatty, greasy, spicy, or fried foods.  Dont eat dairy foods or drink milk if you have diarrhea. These can make diarrhea worse.  During the first 24 hours you can try:  Oral rehydration solutions. Do not use  sports drinks. They have too much sugar and not enough electrolytes.  Soft drinks without caffeine  Ginger ale  Water (plain or flavored)  Decaf tea or coffee  Clear broth, consommé, or bouillon  Gelatin, popsicles, or frozen fruit juice bars  The second 24 hours, if you are feeling better, you can add:  Hot cereal, plain toast, bread, rolls, or crackers  Plain noodles, rice, mashed potatoes, chicken noodle soup, or rice soup  Unsweetened canned fruit (no pineapple)  Bananas  As you recover:  Limit fat intake to less than 15 grams per day. Dont eat margarine, butter, oils, mayonnaise, sauces, gravies, fried foods, peanut butter, meat, poultry, or fish.  Limit fiber. Dont eat raw or cooked vegetables, fresh fruits except bananas, or bran cereals.  Limit caffeine and chocolate.  Limit dairy.  Dont use spices or seasonings except salt.  Go back to your normal diet over time, as you feel better and your symptoms improve.  If the symptoms come back, go back to a simple diet or clear liquids.  Follow-up care  Follow up with your healthcare provider, or as advised. If a stool sample was taken or cultures were done, call the healthcare provider for the results as instructed.  Call 911  Call 911 if you have any of these symptoms:  Trouble breathing  Confusion  Extreme drowsiness or trouble walking  Loss of consciousness  Rapid heart rate  Chest pain  Stiff neck  Seizure  When to seek medical advice  Call your healthcare provider right away if any of these occur:  Abdominal pain that gets worse  Constant lower right abdominal pain  Continued vomiting and inability to keep liquids down  Diarrhea more than 5 times a day  Blood in vomit or stool  Dark urine or no urine for 8 hours, dry mouth and tongue, tiredness, weakness, or dizziness  Drowsiness  New rash  You dont get better in 2 to 3 days  Fever of 100.4°F (38°C) or higher that doesnt get lower with medicine  Date Last Reviewed: 1/3/2016  © 2627-3357 [employer]. [address] [place] [address], [place] [address]. All rights reserved. This information is not intended as a substitute for professional medical care. Always follow your healthcare professional's instructions.

## 2023-04-04 NOTE — PROGRESS NOTES
"Subjective:       Patient ID: Linda Sherwood is a 32 y.o. female Body mass index is 21.6 kg/m².    Chief Complaint: Diarrhea    Patient is established.    PRIOR HISTORY: 10/13/2022 visit note with Dr. Goodrich: "GI Symptoms:  Patient reports several year history of intermittent, severe, cramping abdominal pain associated with intermittent diarrhea.  Patient reports symptoms often triggered by eating.  Patient states that she has tried to keep food diaries to identify food triggers, but she cannot identify a reliable pattern.  GI workup of out state included EGD and colonoscopy - results showed suspected esophageal candidiasis, evidence of diverticula, inflammatory patches, and multiple polyps. Biopsies sent to path - not on record in our system. Pt reports taking medication for treatment of esophageal candidiasis as prescribed by previous GI physician. Pt reports that she was never diagnosed with anything specific but told to take Bentyl as needed, which has not adequately helped her symptoms. Pt reports symptoms are getting worse and she has been having weight loss due to poor appetite and more frequent diarrhea. Of note, pt reports her mother having similar symptoms and her son having similar symptoms."    GI Problem  The primary symptoms include diarrhea. Primary symptoms do not include fever, weight loss, fatigue, abdominal pain, nausea, vomiting, melena, hematemesis, hematochezia or dysuria. The problem has been rapidly improving (since our last visit).   The diarrhea began more than 1 week ago (started several years ago). Daily occurrences: significantly improved; intermittent diarrhea occurs once every 3-4 weeks of 1-2 loose stools; otherwise bowel movements are once-twice daily of formed stool; reports back to normal.   The illness does not include chills, dysphagia, odynophagia or constipation. Associated symptoms comments: Not currently taking bentyl . Significant associated medical issues include " GERD (denies any recently; controlled with prilosec 40 mg once daily), hemorrhoids and diverticulitis. Associated medical issues do not include PUD.     Review of Systems   Constitutional:  Negative for appetite change, chills, fatigue, fever and weight loss.   HENT:  Negative for sore throat and trouble swallowing.    Respiratory:  Negative for cough, choking and shortness of breath.    Cardiovascular:  Negative for chest pain.   Gastrointestinal:  Positive for diarrhea. Negative for abdominal pain, anal bleeding, blood in stool, constipation, dysphagia, hematemesis, hematochezia, melena, nausea, rectal pain and vomiting.   Genitourinary:  Negative for difficulty urinating, dysuria and flank pain.   Neurological:  Negative for weakness.       Patient's last menstrual period was 03/20/2023.  Past Medical History:   Diagnosis Date    Anxiety     Attention deficit disorder with hyperactivity 01/15/2018    Chronic bilateral low back pain without sciatica 03/22/2018    Colon polyp     Depression     Diverticulitis     Diverticulosis     Gastritis     GERD (gastroesophageal reflux disease) 04/12/2018     Past Surgical History:   Procedure Laterality Date    APPENDECTOMY      AUGMENTATION OF BREAST  2009    BREAST SURGERY  2009    COLONOSCOPY  09/28/2021    in media/procedures, Dr. Garay, biopsy report received and sent for scanning; repeat in 5 years for surveillance    DIAGNOSTIC LAPAROSCOPY N/A 11/29/2022    Procedure: LAPAROSCOPY, DIAGNOSTIC;  Surgeon: Jamir Sher Jr., MD;  Location: [facility] OR;  Service: General;  Laterality: N/A;    EGD - EXTERNAL RESULT  09/28/2021    in media/procedures, Dr. 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    LAPAROSCOPIC LYSIS OF ADHESIONS N/A 11/29/2022    Procedure: LYSIS, ADHESIONS, LAPAROSCOPIC;  Surgeon: Jamir Sher Jr., MD;  Location: [facility] OR;  Service: General;  Laterality: N/A;     Family History    Problem Relation Age of Onset    Ovarian cancer Sister 25    Heart disease Paternal Grandfather         Heart attack    Stroke Paternal Grandfather     Colon cancer Neg Hx     Crohn's disease Neg Hx     Esophageal cancer Neg Hx     Stomach cancer Neg Hx     Ulcerative colitis Neg Hx     Celiac disease Neg Hx      Social History     Tobacco Use    Smoking status: Former    Smokeless tobacco: Never   Substance Use Topics    Alcohol use: Not Currently     Alcohol/week: 1.0 standard drink     Types: 1 Cans of beer per week    Drug use: No     Wt Readings from Last 10 Encounters:   04/04/23 55.3 kg (121 lb 14.6 oz)   01/13/23 54.1 kg (119 lb 4.3 oz)   12/12/22 53.1 kg (117 lb 1 oz)   11/29/22 53.1 kg (117 lb)   11/17/22 53.3 kg (117 lb 6.3 oz)   10/21/22 52.9 kg (116 lb 10 oz)   10/13/22 52.7 kg (116 lb 2.9 oz)   09/13/22 55.4 kg (122 lb 2.2 oz)   08/31/22 55.4 kg (122 lb 2.2 oz)   12/18/20 63.1 kg (139 lb 1.8 oz)     Had child in 8/2019 and reports had baby weight for awhile.    Lab Results   Component Value Date    WBC 6.89 10/21/2022    HGB 13.2 10/21/2022    HCT 40.4 10/21/2022    MCV 89 10/21/2022     10/21/2022     CMP  Sodium   Date Value Ref Range Status   03/22/2018 138 136 - 145 mmol/L Final     Potassium   Date Value Ref Range Status   03/22/2018 3.7 3.5 - 5.1 mmol/L Final     Chloride   Date Value Ref Range Status   03/22/2018 105 95 - 110 mmol/L Final     CO2   Date Value Ref Range Status   03/22/2018 27 23 - 29 mmol/L Final     Glucose   Date Value Ref Range Status   03/22/2018 92 70 - 110 mg/dL Final     BUN   Date Value Ref Range Status   03/22/2018 11 6 - 20 mg/dL Final     Creatinine   Date Value Ref Range Status   03/22/2018 0.8 0.5 - 1.4 mg/dL Final     Calcium   Date Value Ref Range Status   03/22/2018 9.5 8.7 - 10.5 mg/dL Final     Total Protein   Date Value Ref Range Status   10/21/2022 7.8 6.0 - 8.4 g/dL Final     Albumin   Date Value Ref Range Status   10/21/2022 4.4 3.5 - 5.2 g/dL Final      Total Bilirubin   Date Value Ref Range Status   10/21/2022 0.2 0.1 - 1.0 mg/dL Final     Comment:     For infants and newborns, interpretation of results should be based  on gestational age, weight and in agreement with clinical  observations.    Premature Infant recommended reference ranges:  Up to 24 hours.............<8.0 mg/dL  Up to 48 hours............<12.0 mg/dL  3-5 days..................<15.0 mg/dL  6-29 days.................<15.0 mg/dL       Alkaline Phosphatase   Date Value Ref Range Status   10/21/2022 69 55 - 135 U/L Final     AST   Date Value Ref Range Status   10/21/2022 16 10 - 40 U/L Final     ALT   Date Value Ref Range Status   10/21/2022 14 10 - 44 U/L Final     Anion Gap   Date Value Ref Range Status   2018 6 (L) 8 - 16 mmol/L Final     eGFR if    Date Value Ref Range Status   2018 >60.0 >60 mL/min/1.73 m^2 Final     eGFR if non    Date Value Ref Range Status   2018 >60.0 >60 mL/min/1.73 m^2 Final     Comment:     Calculation used to obtain the estimated glomerular filtration  rate (eGFR) is the CKD-EPI equation.        Lab Results   Component Value Date    TSH 1.327 10/21/2022     Lab Results   Component Value Date    LIPASE 13 10/21/2022     10/21/2022 celiac serology WNL  10/19/2022 stool studies ordered by PCP team (some are still processing; few budding yeast seen)    Reviewed prior medical records including radiology report of 10/31/2022 CT abdomen pelvis; 2022 visit note with general surgery Dr. Sher; & endoscopy history (see surgical history).    Previously reviewed medical records received from Dr. Garay, summarized below and in medical & surgical history (endoscopies, etc), & was previously sent for scannin2021 EGD & colonoscopy biopsy report: esophagus- mild chronic esophagitis; duodenum WNL, stomach- mild chronic gastritis, negative h pylori; TI WNL, random biopsies WNL, polyp- mixed  "adenomatous/hyperplastic polyp, biopsy at 35 cm ulcer with inflamed granulation tissue, rectum- unremarkable     10/22/2021 HIDA WNL with EF 39%  9/23/2021 abdominal ultrasound "impression no abdominal pathology identified"  Objective:      Physical Exam  Vitals and nursing note reviewed.   Constitutional:       General: She is not in acute distress.     Appearance: Normal appearance. She is well-developed. She is not diaphoretic.   HENT:      Mouth/Throat:      Lips: Pink. No lesions.      Mouth: Mucous membranes are moist. No oral lesions.      Tongue: No lesions.      Pharynx: Oropharynx is clear. No pharyngeal swelling or posterior oropharyngeal erythema.   Eyes:      General: No scleral icterus.     Conjunctiva/sclera: Conjunctivae normal.   Pulmonary:      Effort: Pulmonary effort is normal. No respiratory distress.      Breath sounds: Normal breath sounds. No wheezing.   Abdominal:      General: Bowel sounds are normal. There is no distension or abdominal bruit.      Palpations: Abdomen is soft. Abdomen is not rigid. There is no mass.      Tenderness: There is no abdominal tenderness. There is no guarding or rebound. Negative signs include Harris's sign and McBurney's sign.   Skin:     General: Skin is warm and dry.      Coloration: Skin is not jaundiced or pale.      Findings: No erythema or rash.   Neurological:      Mental Status: She is alert and oriented to person, place, and time.   Psychiatric:         Behavior: Behavior normal.         Thought Content: Thought content normal.         Judgment: Judgment normal.       Assessment:       1. Encounter for monitoring long-term proton pump inhibitor therapy    2. History of gastroesophageal reflux (GERD)    3. History of gastritis    4. Intermittent diarrhea         Plan:       Encounter for monitoring long-term proton pump inhibitor therapy, History of gastroesophageal reflux (GERD), & History of gastritis  -     BASIC METABOLIC PANEL; Future; Expected " date: 04/04/2023  -     Vitamin B12; Future; Expected date: 04/04/2023  -     Magnesium; Future; Expected date: 04/04/2023  - discussed with patient about long term use of reflux medications (preference to use lowest effective dose or discontinuing if possible), the risk and benefits of using these medications long term, the risk of untreated GERD such as cast's esophagus, and recommend a diet high in calcium and/or taking OTC calcium and vitamin d supplements as directed (such as Citracal +D), patient verbalized understanding & patient wants to continue current medication at a lower dosage.  - recommend annual monitoring with blood work to include CMP, CBC, vitamin B12, and magnesium.  - DECREASE TO    omeprazole (PRILOSEC) 20 MG capsule; Take 1 capsule (20 mg total) by mouth before breakfast.  Dispense: 30 capsule; Refill: 3    Intermittent diarrhea  - CAN TAKE BENTYL 20 MG EVERY 6 HOURS PRN AS PREVIOUSLY PRESCRIBED  - recommend increase fiber in diet, especially soluble fiber since this can help bulk up the stool consistency and may help to slow down how fast the stool goes through the colon and can prevent diarrhea; Recommend high fiber diet (20-30 grams of fiber daily)/OTC fiber supplements daily as directed, such as Benefiber or Metamucil.  - Possible colonoscopy pending results of testing and if symptoms worsen    Follow up in about 3 months (around 7/4/2023), or if symptoms worsen or fail to improve.      If no improvement in symptoms or symptoms worsen, call/follow-up at clinic or go to ER.        30 minutes of total time spent on the encounter, which includes face to face time and non-face to face time preparing to see the patient (e.g., review of tests), Obtaining and/or reviewing separately obtained history, Documenting clinical information in the electronic or other health record, Independently interpreting results (not separately reported) and communicating results to the patient/family/caregiver,  or Care coordination (not separately reported).

## 2023-04-11 ENCOUNTER — PATIENT MESSAGE (OUTPATIENT)
Dept: GASTROENTEROLOGY | Facility: CLINIC | Age: 33
End: 2023-04-11
Payer: COMMERCIAL

## 2023-05-19 ENCOUNTER — OFFICE VISIT (OUTPATIENT)
Dept: FAMILY MEDICINE | Facility: CLINIC | Age: 33
End: 2023-05-19
Payer: COMMERCIAL

## 2023-05-19 DIAGNOSIS — F90.9 ATTENTION DEFICIT DISORDER WITH HYPERACTIVITY: ICD-10-CM

## 2023-05-19 PROCEDURE — 99213 PR OFFICE/OUTPT VISIT, EST, LEVL III, 20-29 MIN: ICD-10-PCS | Mod: 95,,, | Performed by: STUDENT IN AN ORGANIZED HEALTH CARE EDUCATION/TRAINING PROGRAM

## 2023-05-19 PROCEDURE — 1160F RVW MEDS BY RX/DR IN RCRD: CPT | Mod: CPTII,95,, | Performed by: STUDENT IN AN ORGANIZED HEALTH CARE EDUCATION/TRAINING PROGRAM

## 2023-05-19 PROCEDURE — 1159F MED LIST DOCD IN RCRD: CPT | Mod: CPTII,95,, | Performed by: STUDENT IN AN ORGANIZED HEALTH CARE EDUCATION/TRAINING PROGRAM

## 2023-05-19 PROCEDURE — 1159F PR MEDICATION LIST DOCUMENTED IN MEDICAL RECORD: ICD-10-PCS | Mod: CPTII,95,, | Performed by: STUDENT IN AN ORGANIZED HEALTH CARE EDUCATION/TRAINING PROGRAM

## 2023-05-19 PROCEDURE — 1160F PR REVIEW ALL MEDS BY PRESCRIBER/CLIN PHARMACIST DOCUMENTED: ICD-10-PCS | Mod: CPTII,95,, | Performed by: STUDENT IN AN ORGANIZED HEALTH CARE EDUCATION/TRAINING PROGRAM

## 2023-05-19 PROCEDURE — 99213 OFFICE O/P EST LOW 20 MIN: CPT | Mod: 95,,, | Performed by: STUDENT IN AN ORGANIZED HEALTH CARE EDUCATION/TRAINING PROGRAM

## 2023-05-19 RX ORDER — LISDEXAMFETAMINE DIMESYLATE 30 MG/1
30 CAPSULE ORAL EVERY MORNING
Qty: 30 CAPSULE | Refills: 0 | Status: SHIPPED | OUTPATIENT
Start: 2023-05-19 | End: 2023-06-21 | Stop reason: SDUPTHER

## 2023-05-19 NOTE — PROGRESS NOTES
Assessment and Plan:    Diagnoses and all orders for this visit:    Attention deficit disorder with hyperactivity  -     lisdexamfetamine (VYVANSE) 30 MG capsule; Take 1 capsule (30 mg total) by mouth every morning.      Follow up in 1 month if changes were made to medication regimen, 3 months if pt stable on current, long-term medication regimen with no changes made at today's appointment, or sooner if otherwise needed.     Betsy Goodrich MD  05/19/2023     Audiovisual Telehealth Visit:      The patient location is: Home  The chief complaint leading to consultation is: (documented below in HPI)  Visit type: Virtual visit with audiovisual  Total time spent on this encounter: 20 minutes.This includes face to face time and non-face to face time preparing to see the patient (eg, review of tests), obtaining and/or reviewing separately obtained history, documenting clinical information in the electronic or other health record, independently interpreting results, and communicating results to the patient/family/caregiver, or care coordinator.       Each patient to whom I provide medical services by telemedicine is: (1) informed of the relationship between the physician and patient and the respective role of any other health care provider with respect to management of the patient; and (2) notified that they may decline to receive medical services by telemedicine and may withdraw from such care at any time. Patient verbally consented to receive this service via audiovisual call.    Patient ID: Linda Sherwood is a 33 y.o. female     HPI: 33 y.o. female with a PMHx as documented below presents to clinic today (via audiovisual telehealth visit) for the following:  - ADD/ADHD: Pt doing well on the current medication dose/regimen. Reports medication helps with concentration and attention. No reported side effects on the current dose of medication including sleep disturbance, weight loss, significant appetite suppression,  anxiety, emotional lability, agitation, and palpitations. PDMP reviewed and no aberrant or unexpected prescriptions.    Past Medical History:   Diagnosis Date    Anxiety     Attention deficit disorder with hyperactivity 01/15/2018    Chronic bilateral low back pain without sciatica 03/22/2018    Colon polyp     Depression     Diverticulitis     Diverticulosis     Gastritis     GERD (gastroesophageal reflux disease) 04/12/2018     Review of Systems   HENT:  Negative for hearing loss.    Eyes:  Negative for discharge.   Respiratory:  Negative for wheezing.    Cardiovascular:  Negative for chest pain and palpitations.   Gastrointestinal:  Negative for blood in stool, constipation, diarrhea and vomiting.   Genitourinary:  Negative for dysuria and hematuria.   Musculoskeletal:  Negative for neck pain.   Neurological:  Negative for weakness and headaches.   Endo/Heme/Allergies:  Negative for polydipsia.     Answers submitted by the patient for this visit:  Review of Systems Questionnaire (Submitted on 5/17/2023)  activity change: No  unexpected weight change: No  rhinorrhea: No  trouble swallowing: No  visual disturbance: No  chest tightness: No  polyuria: No  difficulty urinating: No  menstrual problem: No  joint swelling: No  arthralgias: No  confusion: No  dysphoric mood: No    Physical Exam:   Constitutional:       General: No acute distress.  HENT:      Head: Normocephalic and atraumatic.   Pulmonary:      Effort: Pulmonary effort is normal. No respiratory distress.   Neurological:      General: No focal deficit present.      Mental Status: Alert and oriented to person, place, and time. Mental status is at baseline.      Assessment and Plan:   See above    Betsy Goodrich MD  [facility]  Office: [phone]   Fax: [phone]  05/19/2023       Disclaimer: This note was partly generated using dictation software which may occasionally result in transcription errors.

## 2023-06-21 DIAGNOSIS — F90.9 ATTENTION DEFICIT DISORDER WITH HYPERACTIVITY: ICD-10-CM

## 2023-06-21 RX ORDER — LISDEXAMFETAMINE DIMESYLATE 30 MG/1
30 CAPSULE ORAL EVERY MORNING
Qty: 30 CAPSULE | Refills: 0 | Status: SHIPPED | OUTPATIENT
Start: 2023-06-21 | End: 2023-08-02 | Stop reason: SDUPTHER

## 2023-06-21 NOTE — TELEPHONE ENCOUNTER
No care due was identified.  [facility] Embedded Care Due Messages. Reference number: [id number].   6/21/2023 6:11:07 AM CDT

## 2023-06-21 NOTE — TELEPHONE ENCOUNTER
Lov-5-19-23  Rx pending   Patient's mother called and stated that she is still receiving bills from [facility] from [employer] that was performed in 08/21  Another exception form was filled out and refaxed to health insurance with clinical documentation  inability to perform IADLs/inability to perform BADLs

## 2023-08-02 DIAGNOSIS — F90.9 ATTENTION DEFICIT DISORDER WITH HYPERACTIVITY: ICD-10-CM

## 2023-08-02 DIAGNOSIS — Z79.899 ENCOUNTER FOR MONITORING LONG-TERM PROTON PUMP INHIBITOR THERAPY: ICD-10-CM

## 2023-08-02 DIAGNOSIS — Z87.19 HISTORY OF GASTRITIS: ICD-10-CM

## 2023-08-02 DIAGNOSIS — Z87.19 HISTORY OF GASTROESOPHAGEAL REFLUX (GERD): ICD-10-CM

## 2023-08-02 DIAGNOSIS — Z51.81 ENCOUNTER FOR MONITORING LONG-TERM PROTON PUMP INHIBITOR THERAPY: ICD-10-CM

## 2023-08-02 NOTE — TELEPHONE ENCOUNTER
No care due was identified.  [facility] Embedded Care Due Messages. Reference number: [id number].   8/02/2023 5:33:12 PM CDT

## 2023-08-03 RX ORDER — OMEPRAZOLE 20 MG/1
20 CAPSULE, DELAYED RELEASE ORAL
Qty: 90 CAPSULE | Refills: 3 | Status: SHIPPED | OUTPATIENT
Start: 2023-08-03 | End: 2023-09-12

## 2023-08-03 RX ORDER — LISDEXAMFETAMINE DIMESYLATE 30 MG/1
30 CAPSULE ORAL EVERY MORNING
Qty: 30 CAPSULE | Refills: 0 | Status: SHIPPED | OUTPATIENT
Start: 2023-08-03 | End: 2024-02-09

## 2023-09-06 LAB
HUMAN PAPILLOMAVIRUS (HPV): NORMAL
PAP RECOMMENDATION EXT: NORMAL
PAP SMEAR: NORMAL

## 2023-09-11 ENCOUNTER — PATIENT MESSAGE (OUTPATIENT)
Dept: ADMINISTRATIVE | Facility: HOSPITAL | Age: 33
End: 2023-09-11
Payer: COMMERCIAL

## 2023-09-12 ENCOUNTER — OFFICE VISIT (OUTPATIENT)
Dept: GASTROENTEROLOGY | Facility: CLINIC | Age: 33
End: 2023-09-12
Payer: COMMERCIAL

## 2023-09-12 VITALS — WEIGHT: 123.25 LBS | HEIGHT: 64 IN | BODY MASS INDEX: 21.04 KG/M2

## 2023-09-12 DIAGNOSIS — K21.9 GASTROESOPHAGEAL REFLUX DISEASE WITHOUT ESOPHAGITIS: ICD-10-CM

## 2023-09-12 DIAGNOSIS — R04.2 COUGH WITH HEMOPTYSIS: Primary | ICD-10-CM

## 2023-09-12 DIAGNOSIS — Q43.3 MALROTATION OF INTESTINE: ICD-10-CM

## 2023-09-12 DIAGNOSIS — D62 ANEMIA DUE TO ACUTE BLOOD LOSS: ICD-10-CM

## 2023-09-12 DIAGNOSIS — Z86.010 HISTORY OF COLON POLYPS: ICD-10-CM

## 2023-09-12 PROCEDURE — 99214 PR OFFICE/OUTPT VISIT, EST, LEVL IV, 30-39 MIN: ICD-10-PCS | Mod: S$GLB,,, | Performed by: NURSE PRACTITIONER

## 2023-09-12 PROCEDURE — 99999 PR PBB SHADOW E&M-EST. PATIENT-LVL III: CPT | Mod: PBBFAC,,, | Performed by: NURSE PRACTITIONER

## 2023-09-12 PROCEDURE — 1160F RVW MEDS BY RX/DR IN RCRD: CPT | Mod: CPTII,S$GLB,, | Performed by: NURSE PRACTITIONER

## 2023-09-12 PROCEDURE — 1159F PR MEDICATION LIST DOCUMENTED IN MEDICAL RECORD: ICD-10-PCS | Mod: CPTII,S$GLB,, | Performed by: NURSE PRACTITIONER

## 2023-09-12 PROCEDURE — 3008F BODY MASS INDEX DOCD: CPT | Mod: CPTII,S$GLB,, | Performed by: NURSE PRACTITIONER

## 2023-09-12 PROCEDURE — 99999 PR PBB SHADOW E&M-EST. PATIENT-LVL III: ICD-10-PCS | Mod: PBBFAC,,, | Performed by: NURSE PRACTITIONER

## 2023-09-12 PROCEDURE — 99214 OFFICE O/P EST MOD 30 MIN: CPT | Mod: S$GLB,,, | Performed by: NURSE PRACTITIONER

## 2023-09-12 PROCEDURE — 1159F MED LIST DOCD IN RCRD: CPT | Mod: CPTII,S$GLB,, | Performed by: NURSE PRACTITIONER

## 2023-09-12 PROCEDURE — 1160F PR REVIEW ALL MEDS BY PRESCRIBER/CLIN PHARMACIST DOCUMENTED: ICD-10-PCS | Mod: CPTII,S$GLB,, | Performed by: NURSE PRACTITIONER

## 2023-09-12 PROCEDURE — 3008F PR BODY MASS INDEX (BMI) DOCUMENTED: ICD-10-PCS | Mod: CPTII,S$GLB,, | Performed by: NURSE PRACTITIONER

## 2023-09-12 RX ORDER — OMEPRAZOLE 40 MG/1
40 CAPSULE, DELAYED RELEASE ORAL DAILY
Qty: 30 CAPSULE | Refills: 2 | Status: SHIPPED | OUTPATIENT
Start: 2023-09-12 | End: 2024-02-09

## 2023-09-12 NOTE — PROGRESS NOTES
Subjective:       Patient ID: Linda Sherwood is a 33 y.o. female, Body mass index is 21.15 kg/m².    Chief Complaint: Other (Coughing up blood, Er f/u)      Patient is new to me. Established patient of Abimbola Kim NP.    Gastroesophageal Reflux  She complains of coughing (reports coughing up bright red blood multiple times prior to ED visit; denies currently; has an appt to see Pulmonology on 9/19/23), heartburn and nausea. She reports no abdominal pain, no belching, no chest pain, no choking, no dysphagia, no early satiety, no globus sensation, no hoarse voice, no sore throat, no stridor, no tooth decay, no water brash or no wheezing. This is a new problem. The current episode started yesterday. The problem occurs occasionally. The problem has been unchanged. The heartburn is located in the substernum. The heartburn is of moderate intensity. The heartburn does not wake her from sleep. The heartburn does not limit her activity. The heartburn doesn't change with position. Nothing aggravates the symptoms. Associated symptoms include anemia (H&H 9.7/28.7 on 9/11/23). Pertinent negatives include no melena or weight loss. Risk factors include smoking/tobacco exposure and ETOH use (Former smoker). She has tried a PPI (Currently: Omeprazole 20 mg once daily- helped in the past) for the symptoms. Past procedures include an EGD. Reports she was seen at [facility] in [place]; PE was ruled out and patient was told to follow-up with GI and Pulmonology.     Review of Systems   Constitutional:  Negative for appetite change, fever, unexpected weight change and weight loss.   HENT:  Negative for hoarse voice, sore throat and trouble swallowing.    Respiratory:  Positive for cough (reports coughing up bright red blood multiple times prior to ED visit; denies currently; has an appt to see Pulmonology on 9/19/23). Negative for choking, shortness of breath and wheezing.    Cardiovascular:  Negative for chest pain.    Gastrointestinal:  Positive for heartburn and nausea. Negative for abdominal distention, abdominal pain, anal bleeding, blood in stool, constipation, diarrhea (Resolved since last office visit with NP Julio), dysphagia, melena, rectal pain and vomiting (Resolved since last office visit with NP Julio).   Genitourinary:  Negative for difficulty urinating and dysuria.   Musculoskeletal:  Negative for gait problem.   Skin:  Negative for rash.   Neurological:  Negative for speech difficulty.   Psychiatric/Behavioral:  Negative for confusion.        Past Medical History:   Diagnosis Date    Anxiety     Attention deficit disorder with hyperactivity 01/15/2018    Chronic bilateral low back pain without sciatica 03/22/2018    Colon polyp     Depression     Diverticulitis     Diverticulosis     Gastritis     GERD (gastroesophageal reflux disease) 04/12/2018      Past Surgical History:   Procedure Laterality Date    APPENDECTOMY      AUGMENTATION OF BREAST  2009    BREAST SURGERY  2009    COLONOSCOPY  09/28/2021    in media/procedures, Dr. Garay, biopsy report received and sent for scanning; repeat in 5 years for surveillance    DIAGNOSTIC LAPAROSCOPY N/A 11/29/2022    Procedure: LAPAROSCOPY, DIAGNOSTIC;  Surgeon: Jamir Sher Jr., MD;  Location: [facility] OR;  Service: General;  Laterality: N/A;    EGD - EXTERNAL RESULT  09/28/2021    in media/procedures, Dr. 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    LAPAROSCOPIC LYSIS OF ADHESIONS N/A 11/29/2022    Procedure: LYSIS, ADHESIONS, LAPAROSCOPIC;  Surgeon: Jamir Sher Jr., MD;  Location: [facility] OR;  Service: General;  Laterality: N/A;      Family History   Problem Relation Age of Onset    Ovarian cancer Sister 25    Heart disease Paternal Grandfather         Heart attack    Stroke Paternal Grandfather     Colon cancer Neg Hx     Crohn's disease Neg Hx     Esophageal cancer Neg Hx     Stomach cancer  Neg Hx     Ulcerative colitis Neg Hx     Celiac disease Neg Hx       Wt Readings from Last 10 Encounters:   09/12/23 55.9 kg (123 lb 3.8 oz)   04/04/23 55.3 kg (121 lb 14.6 oz)   01/13/23 54.1 kg (119 lb 4.3 oz)   12/12/22 53.1 kg (117 lb 1 oz)   11/29/22 53.1 kg (117 lb)   11/17/22 53.3 kg (117 lb 6.3 oz)   10/21/22 52.9 kg (116 lb 10 oz)   10/13/22 52.7 kg (116 lb 2.9 oz)   09/13/22 55.4 kg (122 lb 2.2 oz)   08/31/22 55.4 kg (122 lb 2.2 oz)     Lab Results   Component Value Date    WBC 6.89 10/21/2022    HGB 13.2 10/21/2022    HCT 40.4 10/21/2022    MCV 89 10/21/2022     10/21/2022     CMP  Sodium   Date Value Ref Range Status   03/22/2018 138 136 - 145 mmol/L Final     Potassium   Date Value Ref Range Status   03/22/2018 3.7 3.5 - 5.1 mmol/L Final     Chloride   Date Value Ref Range Status   03/22/2018 105 95 - 110 mmol/L Final     CO2   Date Value Ref Range Status   03/22/2018 27 23 - 29 mmol/L Final     Glucose   Date Value Ref Range Status   03/22/2018 92 70 - 110 mg/dL Final     BUN   Date Value Ref Range Status   03/22/2018 11 6 - 20 mg/dL Final     Creatinine   Date Value Ref Range Status   03/22/2018 0.8 0.5 - 1.4 mg/dL Final     Calcium   Date Value Ref Range Status   03/22/2018 9.5 8.7 - 10.5 mg/dL Final     Total Protein   Date Value Ref Range Status   10/21/2022 7.8 6.0 - 8.4 g/dL Final     Albumin   Date Value Ref Range Status   10/21/2022 4.4 3.5 - 5.2 g/dL Final     Total Bilirubin   Date Value Ref Range Status   10/21/2022 0.2 0.1 - 1.0 mg/dL Final     Comment:     For infants and newborns, interpretation of results should be based  on gestational age, weight and in agreement with clinical  observations.    Premature Infant recommended reference ranges:  Up to 24 hours.............<8.0 mg/dL  Up to 48 hours............<12.0 mg/dL  3-5 days..................<15.0 mg/dL  6-29 days.................<15.0 mg/dL       Alkaline Phosphatase   Date Value Ref Range Status   10/21/2022 69 55 - 135 U/L  Final     AST   Date Value Ref Range Status   10/21/2022 16 10 - 40 U/L Final     ALT   Date Value Ref Range Status   10/21/2022 14 10 - 44 U/L Final     Anion Gap   Date Value Ref Range Status   03/22/2018 6 (L) 8 - 16 mmol/L Final     eGFR if    Date Value Ref Range Status   03/22/2018 >60.0 >60 mL/min/1.73 m^2 Final     eGFR if non    Date Value Ref Range Status   03/22/2018 >60.0 >60 mL/min/1.73 m^2 Final     Comment:     Calculation used to obtain the estimated glomerular filtration  rate (eGFR) is the CKD-EPI equation.          Lab Results   Component Value Date    LIPASE 13 10/21/2022              Reviewed prior medical records including radiology report of CT of abdomen and pelvis 10/31/22 & endoscopy history (see surgical history).     Objective:      Physical Exam  Constitutional:       General: She is not in acute distress.     Appearance: She is well-developed.   HENT:      Head: Normocephalic.      Right Ear: Hearing normal.      Left Ear: Hearing normal.      Nose: Nose normal.      Mouth/Throat:      Mouth: No oral lesions.      Pharynx: Uvula midline.   Eyes:      General: Lids are normal.      Conjunctiva/sclera: Conjunctivae normal.      Pupils: Pupils are equal, round, and reactive to light.   Neck:      Trachea: Trachea normal.   Cardiovascular:      Rate and Rhythm: Regular rhythm.      Heart sounds: Normal heart sounds. No murmur heard.  Pulmonary:      Effort: Pulmonary effort is normal. No respiratory distress.      Breath sounds: Normal breath sounds. No stridor. No wheezing.   Abdominal:      General: Bowel sounds are normal. There is no distension.      Palpations: Abdomen is soft. There is no mass.      Tenderness: There is no abdominal tenderness. There is no guarding or rebound.   Musculoskeletal:         General: Normal range of motion.      Cervical back: Normal range of motion.   Skin:     General: Skin is warm and dry.      Findings: No rash.       Comments: Non jaundiced   Neurological:      Mental Status: She is alert and oriented to person, place, and time.   Psychiatric:         Speech: Speech normal.         Behavior: Behavior normal. Behavior is cooperative.           Assessment:       1. Cough with hemoptysis    2. Gastroesophageal reflux disease without esophagitis    3. Anemia due to acute blood loss    4. History of malrotation of intestine    5. History of colon polyps           Plan:   All diagnoses and orders for this visit:    Cough with hemoptysis, Gastroesophageal reflux disease without esophagitis & Anemia due to acute blood loss  - Increase Omeprazole to 40 mg once daily: omeprazole (PRILOSEC) 40 MG capsule; Take 1 capsule (40 mg total) by mouth once daily.  Dispense: 30 capsule; Refill: 2  - Schedule EGD   - Take PPI in the morning 30 minutes before breakfast  - Recommend to avoid large meals, avoid eating within 3 hours of bedtime, elevate head of bed if nocturnal symptoms are present, smoking cessation (if current smoker), & weight loss (if overweight).   - Recommend minimize/avoid high-fat foods, chocolate, caffeine, citrus, alcohol, & tomato products.  - Advised to avoid/limit use of NSAID's, since they can cause GI upset, bleeding, and/or ulcers. If needed, take with food.    - Keep appointment with Pulmonology for continued evaluation and management    History of malrotation of intestine   - Resolved    History of colon polyps   - Next surveillance colonoscopy due 9/2026    If no improvement in symptoms or symptoms worsen, call/follow-up at clinic or go to ER

## 2023-09-13 ENCOUNTER — HOSPITAL ENCOUNTER (OUTPATIENT)
Facility: HOSPITAL | Age: 33
Discharge: HOME | End: 2023-09-13
Attending: INTERNAL MEDICINE | Admitting: INTERNAL MEDICINE
Payer: COMMERCIAL

## 2023-09-13 ENCOUNTER — ANESTHESIA (OUTPATIENT)
Dept: ENDOSCOPY | Facility: HOSPITAL | Age: 33
End: 2023-09-13
Payer: COMMERCIAL

## 2023-09-13 ENCOUNTER — ANESTHESIA EVENT (OUTPATIENT)
Dept: ENDOSCOPY | Facility: HOSPITAL | Age: 33
End: 2023-09-13
Payer: COMMERCIAL

## 2023-09-13 VITALS
SYSTOLIC BLOOD PRESSURE: 97 MMHG | BODY MASS INDEX: 21 KG/M2 | TEMPERATURE: 98 F | DIASTOLIC BLOOD PRESSURE: 56 MMHG | RESPIRATION RATE: 15 BRPM | OXYGEN SATURATION: 99 % | HEART RATE: 75 BPM | HEIGHT: 64 IN | WEIGHT: 123 LBS

## 2023-09-13 DIAGNOSIS — K21.9 GERD (GASTROESOPHAGEAL REFLUX DISEASE): ICD-10-CM

## 2023-09-13 LAB
B-HCG UR QL: NEGATIVE
CTP QC/QA: YES

## 2023-09-13 PROCEDURE — 63600175 PHARM REV CODE 636 W HCPCS: Mod: PO | Performed by: INTERNAL MEDICINE

## 2023-09-13 PROCEDURE — 43239 EGD BIOPSY SINGLE/MULTIPLE: CPT | Mod: ,,, | Performed by: INTERNAL MEDICINE

## 2023-09-13 PROCEDURE — 88305 TISSUE EXAM BY PATHOLOGIST: CPT | Mod: 26,,, | Performed by: PATHOLOGY

## 2023-09-13 PROCEDURE — 43239 PR EGD, FLEX, W/BIOPSY, SGL/MULTI: ICD-10-PCS | Mod: ,,, | Performed by: INTERNAL MEDICINE

## 2023-09-13 PROCEDURE — D9220A PRA ANESTHESIA: Mod: ANES,,, | Performed by: ANESTHESIOLOGY

## 2023-09-13 PROCEDURE — 88305 TISSUE EXAM BY PATHOLOGIST: ICD-10-PCS | Mod: 26,,, | Performed by: PATHOLOGY

## 2023-09-13 PROCEDURE — 63600175 PHARM REV CODE 636 W HCPCS: Mod: PO | Performed by: NURSE ANESTHETIST, CERTIFIED REGISTERED

## 2023-09-13 PROCEDURE — D9220A PRA ANESTHESIA: ICD-10-PCS | Mod: CRNA,,, | Performed by: NURSE ANESTHETIST, CERTIFIED REGISTERED

## 2023-09-13 PROCEDURE — 88305 TISSUE EXAM BY PATHOLOGIST: CPT | Mod: PO | Performed by: PATHOLOGY

## 2023-09-13 PROCEDURE — 81025 URINE PREGNANCY TEST: CPT | Mod: PO | Performed by: INTERNAL MEDICINE

## 2023-09-13 PROCEDURE — 37000008 HC ANESTHESIA 1ST 15 MINUTES: Mod: PO | Performed by: INTERNAL MEDICINE

## 2023-09-13 PROCEDURE — D9220A PRA ANESTHESIA: ICD-10-PCS | Mod: ANES,,, | Performed by: ANESTHESIOLOGY

## 2023-09-13 PROCEDURE — 37000009 HC ANESTHESIA EA ADD 15 MINS: Mod: PO | Performed by: INTERNAL MEDICINE

## 2023-09-13 PROCEDURE — 25000003 PHARM REV CODE 250: Mod: PO | Performed by: NURSE ANESTHETIST, CERTIFIED REGISTERED

## 2023-09-13 PROCEDURE — D9220A PRA ANESTHESIA: Mod: CRNA,,, | Performed by: NURSE ANESTHETIST, CERTIFIED REGISTERED

## 2023-09-13 PROCEDURE — 43239 EGD BIOPSY SINGLE/MULTIPLE: CPT | Mod: PO | Performed by: INTERNAL MEDICINE

## 2023-09-13 PROCEDURE — 27201012 HC FORCEPS, HOT/COLD, DISP: Mod: PO | Performed by: INTERNAL MEDICINE

## 2023-09-13 RX ORDER — SODIUM CHLORIDE 0.9 % (FLUSH) 0.9 %
10 SYRINGE (ML) INJECTION
Status: DISCONTINUED | OUTPATIENT
Start: 2023-09-13 | End: 2023-09-13 | Stop reason: HOSPADM

## 2023-09-13 RX ORDER — SODIUM CHLORIDE, SODIUM LACTATE, POTASSIUM CHLORIDE, CALCIUM CHLORIDE 600; 310; 30; 20 MG/100ML; MG/100ML; MG/100ML; MG/100ML
INJECTION, SOLUTION INTRAVENOUS CONTINUOUS
Status: DISCONTINUED | OUTPATIENT
Start: 2023-09-13 | End: 2023-09-13 | Stop reason: HOSPADM

## 2023-09-13 RX ORDER — LIDOCAINE HYDROCHLORIDE 20 MG/ML
INJECTION INTRAVENOUS
Status: DISCONTINUED | OUTPATIENT
Start: 2023-09-13 | End: 2023-09-13

## 2023-09-13 RX ORDER — PROPOFOL 10 MG/ML
VIAL (ML) INTRAVENOUS
Status: DISCONTINUED | OUTPATIENT
Start: 2023-09-13 | End: 2023-09-13

## 2023-09-13 RX ADMIN — PROPOFOL 50 MG: 10 INJECTION, EMULSION INTRAVENOUS at 12:09

## 2023-09-13 RX ADMIN — PROPOFOL 75 MG: 10 INJECTION, EMULSION INTRAVENOUS at 12:09

## 2023-09-13 RX ADMIN — SODIUM CHLORIDE, POTASSIUM CHLORIDE, SODIUM LACTATE AND CALCIUM CHLORIDE: 600; 310; 30; 20 INJECTION, SOLUTION INTRAVENOUS at 12:09

## 2023-09-13 RX ADMIN — LIDOCAINE HYDROCHLORIDE 100 MG: 20 INJECTION INTRAVENOUS at 12:09

## 2023-09-13 RX ADMIN — PROPOFOL 125 MG: 10 INJECTION, EMULSION INTRAVENOUS at 12:09

## 2023-09-13 NOTE — ANESTHESIA PREPROCEDURE EVALUATION
09/13/2023  Linda Sherwood is a 33 y.o., female.      Pre-op Assessment    I have reviewed the Patient Summary Reports.     I have reviewed the Nursing Notes. I have reviewed the NPO Status.   I have reviewed the Medications.     Review of Systems  Anesthesia Hx:  No problems with previous Anesthesia    Social:  Former Smoker    Cardiovascular:  Cardiovascular Normal     Pulmonary:  Pulmonary Normal    Renal/:  Renal/ Normal     Hepatic/GI:   GERD    Musculoskeletal:  Spine Disorders: lumbar    Neurological:  Neurology Normal    Endocrine:  Endocrine Normal    Psych:   Psychiatric History (ADHD) anxiety depression          Physical Exam  General: Well nourished, Cooperative, Alert and Oriented    Airway:  Mallampati: II   Mouth Opening: Normal  TM Distance: Normal  Neck ROM: Normal ROM        Anesthesia Plan  Type of Anesthesia, risks & benefits discussed:    Anesthesia Type: Gen ETT, Gen Supraglottic Airway, Gen Natural Airway, MAC  Intra-op Monitoring Plan: Standard ASA Monitors  Post Op Pain Control Plan: multimodal analgesia  Induction:  IV  Airway Plan: Direct, Video and Fiberoptic, Post-Induction  Informed Consent: Informed consent signed with the Patient and all parties understand the risks and agree with anesthesia plan.  All questions answered.   ASA Score: 2    Ready For Surgery From Anesthesia Perspective.     .

## 2023-09-13 NOTE — PROVATION PATIENT INSTRUCTIONS
Discharge Summary/Instructions after an Endoscopic Procedure  Patient Name: Linda Sherwood  Patient MRN: [id number]  Patient YOB: 1990 Wednesday, September 13, 2023  Max Marie MD  Dear patient,  As a result of recent federal legislation (The Federal Cures Act), you may   receive lab or pathology results from your procedure in your [facility]   account before your physician is able to contact you. Your physician or   their representative will relay the results to you with their   recommendations at their soonest availability.  Thank you,  RESTRICTIONS:  During your procedure today, you received medications for sedation.  These   medications may affect your judgment, balance and coordination.  Therefore,   for 24 hours, you have the following restrictions:   - DO NOT drive a car, operate machinery, make legal/financial decisions,   sign important papers or drink alcohol.    ACTIVITY:  Today: no heavy lifting, straining or running due to procedural   sedation/anesthesia.  The following day: return to full activity including work.  DIET:  Eat and drink normally unless instructed otherwise.     TREATMENT FOR COMMON SIDE EFFECTS:  - Mild abdominal pain, nausea, belching, bloating or excessive gas:  rest,   eat lightly and use a heating pad.  - Sore Throat: treat with throat lozenges and/or gargle with warm salt   water.  - Because air was used during the procedure, expelling large amounts of air   from your rectum or belching is normal.  - If a bowel prep was taken, you may not have a bowel movement for 1-3 days.    This is normal.  SYMPTOMS TO WATCH FOR AND REPORT TO YOUR PHYSICIAN:  1. Abdominal pain or bloating, other than gas cramps.  2. Chest pain.  3. Back pain.  4. Signs of infection such as: chills or fever occurring within 24 hours   after the procedure.  5. Rectal bleeding, which would show as bright red, maroon, or black stools.   (A tablespoon of blood from the rectum is not serious,  especially if   hemorrhoids are present.)  6. Vomiting.  7. Weakness or dizziness.  GO DIRECTLY TO THE NEAREST EMERGENCY ROOM IF YOU HAVE ANY OF THE FOLLOWING:      Difficulty breathing              Chills and/or fever over 101 F   Persistent vomiting and/or vomiting blood   Severe abdominal pain   Severe chest pain   Black, tarry stools   Bleeding- more than one tablespoon   Any other symptom or condition that you feel may need urgent attention  Your doctor recommends these additional instructions:  If any biopsies were taken, your doctors clinic will contact you in 1 to 2   weeks with any results.  We are waiting for your pathology results.   Continue your present medications.   You are being discharged to home.  For questions, problems or results please call your physician - Max Marie MD at Work:  [phone].  EMERGENCY PHONE NUMBER: [phone], LAB RESULTS: [phone]  IF A COMPLICATION OR EMERGENCY SITUATION ARISES AND YOU ARE UNABLE TO REACH   YOUR PHYSICIAN - GO DIRECTLY TO THE EMERGENCY ROOM.  ___________________________________________  Nurse Signature  ___________________________________________  Patient/Designated Responsible Party Signature  Max Marie MD  9/13/2023 12:43:17 PM  This report has been verified and signed electronically.  Dear patient,  As a result of recent federal legislation (The Federal Cures Act), you may   receive lab or pathology results from your procedure in your [facility]   account before your physician is able to contact you. Your physician or   their representative will relay the results to you with their   recommendations at their soonest availability.  Thank you.  PROVATION

## 2023-09-13 NOTE — ANESTHESIA POSTPROCEDURE EVALUATION
Anesthesia Post Evaluation    Patient: Linda Sherwood    Procedure(s) Performed: Procedure(s) (LRB):  EGD (ESOPHAGOGASTRODUODENOSCOPY) (N/A)    Final Anesthesia Type: general      Patient location during evaluation: PACU  Patient participation: Yes- Able to Participate  Level of consciousness: awake and alert and oriented  Post-procedure vital signs: reviewed and stable  Pain management: adequate  Airway patency: patent    PONV status at discharge: No PONV  Anesthetic complications: no      Cardiovascular status: blood pressure returned to baseline and stable  Respiratory status: unassisted and spontaneous ventilation  Hydration status: euvolemic  Follow-up not needed.          Vitals Value Taken Time   BP 97/56 09/13/23 1255   Temp   09/13/23 1337   Pulse 75 09/13/23 1255   Resp 15 09/13/23 1255   SpO2 99 % 09/13/23 1255         Event Time   Out of Recovery 13:12:00         Pain/Silvia Score: Silvia Score: 10 (9/13/2023 12:59 PM)

## 2023-09-13 NOTE — DISCHARGE SUMMARY
[facility] - Endoscopy  Discharge Note  Short Stay    Discharge Note  Short Stay      SUMMARY     Admit Date: 9/13/2023    Attending Physician: Max Marie MD     Discharge Physician: Max Marie MD    Discharge Date: 9/13/2023 12:44 PM    Final Diagnosis: Hemoptysis [R04.2]  Anemia, unspecified type [D64.9]  Gastroesophageal reflux disease, unspecified whether esophagitis present [K21.9]    Disposition: HOME OR SELF CARE    Patient Instructions:   Current Discharge Medication List        CONTINUE these medications which have NOT CHANGED    Details   lisdexamfetamine (VYVANSE) 30 MG capsule Take 1 capsule (30 mg total) by mouth every morning.  Qty: 30 capsule, Refills: 0    Associated Diagnoses: Attention deficit disorder with hyperactivity      omeprazole (PRILOSEC) 40 MG capsule Take 1 capsule (40 mg total) by mouth once daily.  Qty: 30 capsule, Refills: 2    Associated Diagnoses: Gastroesophageal reflux disease without esophagitis      sertraline (ZOLOFT) 50 MG tablet TAKE 1 TABLET BY MOUTH EVERY DAY  Qty: 90 tablet, Refills: 1    Associated Diagnoses: Anxiety             Discharge Procedure Orders (must include Diet, Follow-up, Activity)    Follow Up:  Follow up with PCP as previously scheduled  Resume routine diet.  Activity as tolerated.    No driving day of procedure.

## 2023-09-13 NOTE — H&P
History & Physical - Short Stay  Gastroenterology      SUBJECTIVE:     Procedure: EGD    Chief Complaint/Indication for Procedure: Reflux    PTA Medications   Medication Sig    lisdexamfetamine (VYVANSE) 30 MG capsule Take 1 capsule (30 mg total) by mouth every morning.    omeprazole (PRILOSEC) 40 MG capsule Take 1 capsule (40 mg total) by mouth once daily.    sertraline (ZOLOFT) 50 MG tablet TAKE 1 TABLET BY MOUTH EVERY DAY       Review of patient's allergies indicates:  No Known Allergies     Past Medical History:   Diagnosis Date    Anxiety     Attention deficit disorder with hyperactivity 01/15/2018    Chronic bilateral low back pain without sciatica 03/22/2018    Colon polyp     Depression     Diverticulitis     Diverticulosis     Gastritis     GERD (gastroesophageal reflux disease) 04/12/2018     Past Surgical History:   Procedure Laterality Date    APPENDECTOMY      AUGMENTATION OF BREAST  2009    BREAST SURGERY  2009    COLONOSCOPY  09/28/2021    in media/procedures, Dr. Garay, biopsy report received and sent for scanning; repeat in 5 years for surveillance    DIAGNOSTIC LAPAROSCOPY N/A 11/29/2022    Procedure: LAPAROSCOPY, DIAGNOSTIC;  Surgeon: Jamir Sher Jr., MD;  Location: [facility] OR;  Service: General;  Laterality: N/A;    EGD - EXTERNAL RESULT  09/28/2021    in media/procedures, Dr. 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    LAPAROSCOPIC LYSIS OF ADHESIONS N/A 11/29/2022    Procedure: LYSIS, ADHESIONS, LAPAROSCOPIC;  Surgeon: Jamir Sher Jr., MD;  Location: [facility] OR;  Service: General;  Laterality: N/A;     Family History   Problem Relation Age of Onset    Ovarian cancer Sister 25    Heart disease Paternal Grandfather         Heart attack    Stroke Paternal Grandfather     Colon cancer Neg Hx     Crohn's disease Neg Hx     Esophageal cancer Neg Hx     Stomach cancer Neg Hx     Ulcerative colitis Neg Hx     Celiac disease Neg  Hx      Social History     Tobacco Use    Smoking status: Former    Smokeless tobacco: Never   Substance Use Topics    Alcohol use: Yes     Alcohol/week: 1.0 standard drink of alcohol     Types: 1 Cans of beer per week     Comment: ocasionally; 3x monthly    Drug use: No         OBJECTIVE:     Vital Signs (Most Recent)       Physical Exam:                                                       GENERAL:  Comfortable, in no acute distress.                                 HEENT EXAM:  Nonicteric.  No adenopathy.  Oropharynx is clear.               NECK:  Supple.                                                               LUNGS:  Clear.                                                               CARDIAC:  Regular rate and rhythm.  S1, S2.  No murmur.                      ABDOMEN:  Soft, positive bowel sounds, nontender.  No hepatosplenomegaly or masses.  No rebound or guarding.                                             EXTREMITIES:  No edema.     MENTAL STATUS:  Normal, alert and oriented.      ASSESSMENT/PLAN:     Assessment: Reflux    Plan: EGD    Anesthesia Plan: General    ASA Grade: ASA 2 - Patient with mild systemic disease with no functional limitations    MALLAMPATI SCORE:  I (soft palate, uvula, fauces, and tonsillar pillars visible)

## 2023-09-13 NOTE — TRANSFER OF CARE
"Anesthesia Transfer of Care Note    Patient: Linda Sherwood    Procedure(s) Performed: Procedure(s) (LRB):  EGD (ESOPHAGOGASTRODUODENOSCOPY) (N/A)    Patient location: PACU    Anesthesia Type: general    Transport from OR: Transported from OR on room air with adequate spontaneous ventilation    Post pain: adequate analgesia    Post assessment: no apparent anesthetic complications and tolerated procedure well    Post vital signs: stable    Level of consciousness: sedated and awake    Nausea/Vomiting: no nausea/vomiting    Complications: none    Transfer of care protocol was followed      Last vitals:   Visit Vitals  /65 (BP Location: Right arm, Patient Position: Sitting)   Pulse 87   Temp 36.5 °C (97.7 °F) (Skin)   Resp 16   Ht 5' 4" (1.626 m)   Wt 55.8 kg (123 lb)   LMP 08/28/2023 (Approximate)   SpO2 100%   Breastfeeding No   BMI 21.11 kg/m²     "

## 2023-09-18 LAB
FINAL PATHOLOGIC DIAGNOSIS: NORMAL
GROSS: NORMAL
Lab: NORMAL

## 2023-09-20 ENCOUNTER — PATIENT OUTREACH (OUTPATIENT)
Dept: ADMINISTRATIVE | Facility: HOSPITAL | Age: 33
End: 2023-09-20
Payer: COMMERCIAL

## 2023-09-20 NOTE — PROGRESS NOTES
Population Health Chart Review & Patient Outreach Details:     Reason for Outreach Encounter:     [x]  Non-Compliant Report   []  Payor Report ([employer], PHN, BCBS, MSSP, MCIP, UHC, etc.)   []  Pre-Visit Chart Review     Updates Requested / Reviewed:     []  Care Everywhere    []     [x]  External Sources ([facility], [facility], DIS, etc.)   []  Care Team Updated    Patient Outreach Method:    []  Telephone Outreach Completed   [] Successful   [] Left Voicemail   [] Unable to Contact (wrong number, no voicemail)  []  [facility] Outreach Sent  []  Letter Outreach Mailed  []  Fax Sent for External Records  [x]  External Records Upload    Health Maintenance Topics Addressed and Outreach Outcomes / Actions Taken:        []      Breast Cancer Screening []  Mammo Scheduled      []  External Records Requested     []  Added Reminder to Complete to Upcoming Primary Care Appt Notes     []  Patient Declined     []  Patient Will Call Back to Schedule     []  Patient Will Schedule with External Provider / Order Routed if Applicable             [x]       Cervical Cancer Screening []  Pap Scheduled      []  External Records Requested     []  Added Reminder to Complete to Upcoming Primary Care Appt Notes     []  Patient Declined     []  Patient Will Call Back to Schedule     []  Patient Will Schedule with External Provider               []          Colorectal Cancer Screening []  Colonoscopy Case Request or Referral Placed     []  External Records Requested     []  Added Reminder to Complete to Upcoming Primary Care Appt Notes     []  Patient Declined     []  Patient Will Call Back to Schedule     []  Patient Will Schedule with External Provider     []  Fit Kit Mailed (add the SmartPhrase under additional notes)     []  Reminded Patient to Complete Home Test             []      Diabetic Eye Exam []  Eye Camera Scheduled or Optometry Referral Placed     []  External Records Requested     []  Added Reminder to Complete to  Upcoming Primary Care Appt Notes     []  Patient Declined     []  Patient Will Call Back to Schedule     []  Patient Will Schedule with External Provider             []      Blood Pressure Control []  Primary Care Follow Up Visit Scheduled     []  Remote Blood Pressure Reading Captured     []  Added Reminder to Complete to Upcoming Primary Care Appt Notes     []  Patient Declined     []  Patient Will Call Back / Patient Will Send Portal Message with Reading     []  Patient Will Call Back to Schedule Provider Visit             []       HbA1c & Other Labs []  Lab Appt Scheduled for Due Labs     []  Primary Care Follow Up Visit Scheduled      []  Reminded Patient to Complete Home Test     []  Added Reminder to Complete to Upcoming Primary Care Appt Notes     []  Patient Declined     []  Patient Will Call Back to Schedule     []  Patient Will Schedule with External Provider / Order Routed if Applicable           []    Schedule Primary Care Appt []  Primary Care Appt Scheduled     []  Patient Declined     []  Patient Will Call Back to Schedule     []  Pt Established with External Provider & Updated Care Team             []      Medication Adherence []  Primary Care Appointment Scheduled     []  Added Reminder to Upcoming Primary Care Appt Notes     []  Patient Reminded to  Prescription     []  Patient Declined, Provider Notified if Needed     []  Sent Provider Message to Review and/or Add Exclusion to Problem List             []      Osteoporosis Screening []  DXA Appointment Scheduled     []  External Records Requested     []  Added Reminder to Complete to Upcoming Primary Care Appt Notes     []  Patient Declined     []  Patient Will Call Back to Schedule     []  Patient Will Schedule with External Provider / Order Routed if Applicable     Additional Care Coordinator Notes:         Further Action Needed If Patient Returns Outreach:

## 2023-10-14 DIAGNOSIS — F41.9 ANXIETY: ICD-10-CM

## 2023-10-14 NOTE — TELEPHONE ENCOUNTER
No care due was identified.  [facility] Embedded Care Due Messages. Reference number: [id number].   10/14/2023 9:23:20 AM CDT

## 2023-10-15 RX ORDER — SERTRALINE HYDROCHLORIDE 50 MG/1
TABLET, FILM COATED ORAL
Qty: 90 TABLET | Refills: 2 | Status: SHIPPED | OUTPATIENT
Start: 2023-10-15 | End: 2024-01-29 | Stop reason: SDUPTHER

## 2023-10-15 NOTE — TELEPHONE ENCOUNTER
Refill Decision Note   Linda Sherwood  is requesting a refill authorization.  Brief Assessment and Rationale for Refill:  Approve     Medication Therapy Plan:       Medication Reconciliation Completed: No   Comments:     No Care Gaps recommended.     Note composed:3:05 PM 10/15/2023

## 2023-12-14 ENCOUNTER — OFFICE VISIT (OUTPATIENT)
Dept: FAMILY MEDICINE | Facility: CLINIC | Age: 33
End: 2023-12-14
Payer: COMMERCIAL

## 2023-12-14 VITALS
OXYGEN SATURATION: 100 % | HEIGHT: 64 IN | DIASTOLIC BLOOD PRESSURE: 60 MMHG | HEART RATE: 77 BPM | BODY MASS INDEX: 23.14 KG/M2 | SYSTOLIC BLOOD PRESSURE: 108 MMHG | WEIGHT: 135.56 LBS

## 2023-12-14 DIAGNOSIS — F90.9 ATTENTION DEFICIT DISORDER WITH HYPERACTIVITY: ICD-10-CM

## 2023-12-14 DIAGNOSIS — Z02.0 SCHOOL PHYSICAL EXAM: Primary | ICD-10-CM

## 2023-12-14 PROCEDURE — 99999 PR PBB SHADOW E&M-EST. PATIENT-LVL III: ICD-10-PCS | Mod: PBBFAC,,, | Performed by: STUDENT IN AN ORGANIZED HEALTH CARE EDUCATION/TRAINING PROGRAM

## 2023-12-14 PROCEDURE — 3074F SYST BP LT 130 MM HG: CPT | Mod: CPTII,S$GLB,, | Performed by: STUDENT IN AN ORGANIZED HEALTH CARE EDUCATION/TRAINING PROGRAM

## 2023-12-14 PROCEDURE — 3078F DIAST BP <80 MM HG: CPT | Mod: CPTII,S$GLB,, | Performed by: STUDENT IN AN ORGANIZED HEALTH CARE EDUCATION/TRAINING PROGRAM

## 2023-12-14 PROCEDURE — 99214 OFFICE O/P EST MOD 30 MIN: CPT | Mod: S$GLB,,, | Performed by: STUDENT IN AN ORGANIZED HEALTH CARE EDUCATION/TRAINING PROGRAM

## 2023-12-14 PROCEDURE — 1159F PR MEDICATION LIST DOCUMENTED IN MEDICAL RECORD: ICD-10-PCS | Mod: CPTII,S$GLB,, | Performed by: STUDENT IN AN ORGANIZED HEALTH CARE EDUCATION/TRAINING PROGRAM

## 2023-12-14 PROCEDURE — 3078F PR MOST RECENT DIASTOLIC BLOOD PRESSURE < 80 MM HG: ICD-10-PCS | Mod: CPTII,S$GLB,, | Performed by: STUDENT IN AN ORGANIZED HEALTH CARE EDUCATION/TRAINING PROGRAM

## 2023-12-14 PROCEDURE — 1160F RVW MEDS BY RX/DR IN RCRD: CPT | Mod: CPTII,S$GLB,, | Performed by: STUDENT IN AN ORGANIZED HEALTH CARE EDUCATION/TRAINING PROGRAM

## 2023-12-14 PROCEDURE — 3008F PR BODY MASS INDEX (BMI) DOCUMENTED: ICD-10-PCS | Mod: CPTII,S$GLB,, | Performed by: STUDENT IN AN ORGANIZED HEALTH CARE EDUCATION/TRAINING PROGRAM

## 2023-12-14 PROCEDURE — 99999 PR PBB SHADOW E&M-EST. PATIENT-LVL III: CPT | Mod: PBBFAC,,, | Performed by: STUDENT IN AN ORGANIZED HEALTH CARE EDUCATION/TRAINING PROGRAM

## 2023-12-14 PROCEDURE — 3008F BODY MASS INDEX DOCD: CPT | Mod: CPTII,S$GLB,, | Performed by: STUDENT IN AN ORGANIZED HEALTH CARE EDUCATION/TRAINING PROGRAM

## 2023-12-14 PROCEDURE — 3074F PR MOST RECENT SYSTOLIC BLOOD PRESSURE < 130 MM HG: ICD-10-PCS | Mod: CPTII,S$GLB,, | Performed by: STUDENT IN AN ORGANIZED HEALTH CARE EDUCATION/TRAINING PROGRAM

## 2023-12-14 PROCEDURE — 1160F PR REVIEW ALL MEDS BY PRESCRIBER/CLIN PHARMACIST DOCUMENTED: ICD-10-PCS | Mod: CPTII,S$GLB,, | Performed by: STUDENT IN AN ORGANIZED HEALTH CARE EDUCATION/TRAINING PROGRAM

## 2023-12-14 PROCEDURE — 99214 PR OFFICE/OUTPT VISIT, EST, LEVL IV, 30-39 MIN: ICD-10-PCS | Mod: S$GLB,,, | Performed by: STUDENT IN AN ORGANIZED HEALTH CARE EDUCATION/TRAINING PROGRAM

## 2023-12-14 PROCEDURE — 1159F MED LIST DOCD IN RCRD: CPT | Mod: CPTII,S$GLB,, | Performed by: STUDENT IN AN ORGANIZED HEALTH CARE EDUCATION/TRAINING PROGRAM

## 2023-12-14 RX ORDER — LISDEXAMFETAMINE DIMESYLATE 30 MG/1
30 CAPSULE ORAL DAILY
Qty: 30 CAPSULE | Refills: 0 | Status: SHIPPED | OUTPATIENT
Start: 2024-02-12 | End: 2024-03-13

## 2023-12-14 RX ORDER — LISDEXAMFETAMINE DIMESYLATE 30 MG/1
30 CAPSULE ORAL DAILY
Qty: 30 CAPSULE | Refills: 0 | Status: SHIPPED | OUTPATIENT
Start: 2023-12-14 | End: 2024-01-13

## 2023-12-14 RX ORDER — LISDEXAMFETAMINE DIMESYLATE 30 MG/1
30 CAPSULE ORAL DAILY
Qty: 30 CAPSULE | Refills: 0 | Status: SHIPPED | OUTPATIENT
Start: 2024-01-13 | End: 2024-02-12 | Stop reason: SDUPTHER

## 2023-12-14 NOTE — PROGRESS NOTES
Plan:      Linda was seen today for annual exam.    Diagnoses and all orders for this visit:    School physical exam: Paperwork completed and returned to patient.    Attention deficit disorder with hyperactivity: May need additional short-acting stimulant medication depending on nursing school schedule - pt will contact clinic if needed.  -     lisdexamfetamine (VYVANSE) 30 MG capsule; Take 1 capsule (30 mg total) by mouth once daily.  -     lisdexamfetamine (VYVANSE) 30 MG capsule; Take 1 capsule (30 mg total) by mouth once daily.  -     lisdexamfetamine (VYVANSE) 30 MG capsule; Take 1 capsule (30 mg total) by mouth once daily.      Follow up in about 3 months (around 3/14/2024), or if symptoms worsen or fail to improve.    Betsy Goodrich MD  12/14/2023    Subjective:      Patient ID: Linda Sherwood is a 33 y.o. female    Chief Complaint   Patient presents with    Annual Exam     School Physical     HPI  33 y.o. female with a PMHx as documented below presents to clinic today for the following:    ADD/ADHD: Pt doing well on the current medication dose/regimen. Reports medication helps with concentration and attention. No reported side effects on the current dose of medication including sleep disturbance, weight loss, significant appetite suppression, anxiety, emotional lability, agitation, and palpitations. PDMP reviewed and no aberrant or unexpected prescriptions.    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    ROS  Constitutional:  Negative for chills and fever.   Respiratory:  Negative for shortness of breath.    Cardiovascular:  Negative for chest pain.   Gastrointestinal:  Negative for abdominal pain, constipation, diarrhea, nausea and vomiting.     Current Outpatient Medications   Medication Instructions    lisdexamfetamine (VYVANSE) 30 mg, Oral, Every morning    lisdexamfetamine (VYVANSE) 30 mg, Oral, Daily    [START ON 1/13/2024] lisdexamfetamine (VYVANSE) 30 mg, Oral,  "Daily    [START ON 2/12/2024] lisdexamfetamine (VYVANSE) 30 mg, Oral, Daily    omeprazole (PRILOSEC) 40 mg, Oral, Daily    sertraline (ZOLOFT) 50 MG tablet TAKE 1 TABLET BY MOUTH EVERY DAY      Past Medical History:   Diagnosis Date    Anxiety     Attention deficit disorder with hyperactivity 01/15/2018    Chronic bilateral low back pain without sciatica 03/22/2018    Colon polyp     Depression     Diverticulitis     Diverticulosis     Gastritis     GERD (gastroesophageal reflux disease) 04/12/2018      Objective:      Vitals:    12/14/23 1331   BP: 108/60   Pulse: 77   SpO2: 100%   Weight: 61.5 kg (135 lb 9.3 oz)   Height: 5' 4" (1.626 m)     Body mass index is 23.27 kg/m².    Physical Exam   Constitutional:       General: No acute distress.  HENT:      Head: Normocephalic and atraumatic.   Pulmonary:      Effort: Pulmonary effort is normal. No respiratory distress.   Neurological:      General: No focal deficit present.      Mental Status: Alert and oriented to person, place, and time. Mental status is at baseline.    Assessment:       1. School physical exam    2. Attention deficit disorder with hyperactivity        Betsy Goodrich MD  [facility]  Office: [phone]   Fax: [phone]  12/14/2023      Disclaimer: This note was partly generated using dictation software which may occasionally result in transcription errors.    Total time spent on this encounter includes face to face time and non-face to face time preparing to see the patient (eg, review of tests), obtaining and/or reviewing separately obtained history, documenting clinical information in the electronic or other health record, independently interpreting results, and communicating results to the patient/family/caregiver, or care coordinator.    "

## 2024-01-29 DIAGNOSIS — F41.9 ANXIETY: ICD-10-CM

## 2024-01-29 NOTE — TELEPHONE ENCOUNTER
No care due was identified.  [facility] Embedded Care Due Messages. Reference number: [id number].   1/29/2024 1:51:10 PM CST

## 2024-01-29 NOTE — TELEPHONE ENCOUNTER
Please approve for sertraline (ZOLOFT) 50 MG     Last OV 12/14/23  Last refill date 10/15/23  Last labs ---  90x2r  Next appt ----

## 2024-01-30 RX ORDER — SERTRALINE HYDROCHLORIDE 50 MG/1
50 TABLET, FILM COATED ORAL DAILY
Qty: 90 TABLET | Refills: 3 | Status: SHIPPED | OUTPATIENT
Start: 2024-01-30 | End: 2024-06-06

## 2024-01-30 NOTE — TELEPHONE ENCOUNTER
Refill Decision Note   Linda Sherwood  is requesting a refill authorization.  Brief Assessment and Rationale for Refill:  Approve     Medication Therapy Plan:         Comments:     Note composed:2:53 AM 01/30/2024

## 2024-02-12 DIAGNOSIS — F90.9 ATTENTION DEFICIT DISORDER WITH HYPERACTIVITY: ICD-10-CM

## 2024-02-13 NOTE — TELEPHONE ENCOUNTER
No care due was identified.  [facility] Embedded Care Due Messages. Reference number: [id number].   2/12/2024 8:38:41 PM CST

## 2024-02-14 RX ORDER — LISDEXAMFETAMINE DIMESYLATE 30 MG/1
30 CAPSULE ORAL DAILY
Qty: 30 CAPSULE | Refills: 0 | Status: SHIPPED | OUTPATIENT
Start: 2024-02-14 | End: 2024-03-15

## 2024-04-12 ENCOUNTER — PATIENT MESSAGE (OUTPATIENT)
Dept: FAMILY MEDICINE | Facility: CLINIC | Age: 34
End: 2024-04-12
Payer: COMMERCIAL

## 2024-06-06 ENCOUNTER — OFFICE VISIT (OUTPATIENT)
Dept: FAMILY MEDICINE | Facility: CLINIC | Age: 34
End: 2024-06-06
Payer: COMMERCIAL

## 2024-06-06 DIAGNOSIS — L23.7 CONTACT DERMATITIS DUE TO POISON IVY: Primary | ICD-10-CM

## 2024-06-06 DIAGNOSIS — K13.0 ANGULAR CHEILITIS DUE TO BACTERIAL INFECTION: ICD-10-CM

## 2024-06-06 DIAGNOSIS — A49.9 ANGULAR CHEILITIS DUE TO BACTERIAL INFECTION: ICD-10-CM

## 2024-06-06 PROCEDURE — 99214 OFFICE O/P EST MOD 30 MIN: CPT | Mod: 95,,, | Performed by: STUDENT IN AN ORGANIZED HEALTH CARE EDUCATION/TRAINING PROGRAM

## 2024-06-06 RX ORDER — MUPIROCIN 20 MG/G
OINTMENT TOPICAL 3 TIMES DAILY
Qty: 30 G | Refills: 0 | Status: SHIPPED | OUTPATIENT
Start: 2024-06-06 | End: 2024-06-13

## 2024-06-06 RX ORDER — SULFAMETHOXAZOLE AND TRIMETHOPRIM 800; 160 MG/1; MG/1
1 TABLET ORAL 2 TIMES DAILY
Qty: 14 TABLET | Refills: 0 | Status: SHIPPED | OUTPATIENT
Start: 2024-06-06 | End: 2024-06-13

## 2024-06-06 RX ORDER — METHYLPREDNISOLONE 4 MG/1
TABLET ORAL
Qty: 1 EACH | Refills: 0 | Status: SHIPPED | OUTPATIENT
Start: 2024-06-06

## 2024-07-22 ENCOUNTER — OFFICE VISIT (OUTPATIENT)
Dept: FAMILY MEDICINE | Facility: CLINIC | Age: 34
End: 2024-07-22
Payer: COMMERCIAL

## 2024-07-22 DIAGNOSIS — F33.0 MILD EPISODE OF RECURRENT MAJOR DEPRESSIVE DISORDER: ICD-10-CM

## 2024-07-22 DIAGNOSIS — F41.9 ANXIETY: Primary | ICD-10-CM

## 2024-07-22 DIAGNOSIS — F90.9 ATTENTION DEFICIT DISORDER WITH HYPERACTIVITY: ICD-10-CM

## 2024-07-22 PROCEDURE — 1159F MED LIST DOCD IN RCRD: CPT | Mod: CPTII,95,, | Performed by: STUDENT IN AN ORGANIZED HEALTH CARE EDUCATION/TRAINING PROGRAM

## 2024-07-22 PROCEDURE — 1160F RVW MEDS BY RX/DR IN RCRD: CPT | Mod: CPTII,95,, | Performed by: STUDENT IN AN ORGANIZED HEALTH CARE EDUCATION/TRAINING PROGRAM

## 2024-07-22 PROCEDURE — 99214 OFFICE O/P EST MOD 30 MIN: CPT | Mod: 95,,, | Performed by: STUDENT IN AN ORGANIZED HEALTH CARE EDUCATION/TRAINING PROGRAM

## 2024-07-22 RX ORDER — LISDEXAMFETAMINE DIMESYLATE 30 MG/1
30 CAPSULE ORAL EVERY MORNING
Qty: 30 CAPSULE | Refills: 0 | Status: SHIPPED | OUTPATIENT
Start: 2024-07-22 | End: 2024-08-21

## 2024-07-22 RX ORDER — LISDEXAMFETAMINE DIMESYLATE 30 MG/1
30 CAPSULE ORAL EVERY MORNING
Qty: 30 CAPSULE | Refills: 0 | Status: SHIPPED | OUTPATIENT
Start: 2024-08-21 | End: 2024-09-20

## 2024-07-22 RX ORDER — LISDEXAMFETAMINE DIMESYLATE 30 MG/1
30 CAPSULE ORAL EVERY MORNING
Qty: 30 CAPSULE | Refills: 0 | Status: SHIPPED | OUTPATIENT
Start: 2024-09-20 | End: 2024-10-20

## 2024-07-22 RX ORDER — ESCITALOPRAM OXALATE 5 MG/1
5 TABLET ORAL DAILY
Qty: 90 TABLET | Refills: 3 | Status: SHIPPED | OUTPATIENT
Start: 2024-07-22 | End: 2025-07-22

## 2024-07-22 NOTE — PROGRESS NOTES
Assessment and Plan:    Diagnoses and all orders for this visit:    Anxiety  -     EScitalopram oxalate (LEXAPRO) 5 MG Tab; Take 1 tablet (5 mg total) by mouth once daily.    Mild episode of recurrent major depressive disorder  -     EScitalopram oxalate (LEXAPRO) 5 MG Tab; Take 1 tablet (5 mg total) by mouth once daily.    Attention deficit disorder with hyperactivity  -     lisdexamfetamine (VYVANSE) 30 MG capsule; Take 1 capsule (30 mg total) by mouth every morning.  -     lisdexamfetamine (VYVANSE) 30 MG capsule; Take 1 capsule (30 mg total) by mouth every morning.  -     lisdexamfetamine (VYVANSE) 30 MG capsule; Take 1 capsule (30 mg total) by mouth every morning.      Patients must have an in-person appointment at least once every 12 months to continue receiving prescriptions for controlled substances used in the treatment of ADHD. In addition to the annual in-person visit, patients are required to have follow-up appointments either in person or virtually every 3 months.     Betsy Goodrich MD  07/22/2024     Audiovisual Telehealth Visit:      The patient location is: Home  The chief complaint leading to consultation is: (documented below in HPI)  Visit type: Virtual visit with audiovisual  Total time spent on this encounter: 20 minutes.This includes face to face time and non-face to face time preparing to see the patient (eg, review of tests), obtaining and/or reviewing separately obtained history, documenting clinical information in the electronic or other health record, independently interpreting results, and communicating results to the patient/family/caregiver, or care coordinator.       Each patient to whom I provide medical services by telemedicine is: (1) informed of the relationship between the physician and patient and the respective role of any other health care provider with respect to management of the patient; and (2) notified that they may decline to receive medical services by telemedicine and  may withdraw from such care at any time. Patient verbally consented to receive this service via audiovisual call.    Patient ID: Linda Sherwood is a 34 y.o. female     HPI: 34 y.o. female with a PMHx as documented below presents to clinic today (via audiovisual telehealth visit) for the following:  - ADD/ADHD: Pt doing well on the current medication dose/regimen. Reports medication helps with concentration and attention. No reported side effects on the current dose of medication including sleep disturbance, weight loss, significant appetite suppression, anxiety, emotional lability, agitation, and palpitations. PDMP reviewed and no aberrant or unexpected prescriptions.    Anxiety/depression:   - Previously on Zoloft- did not tolerate 2/2 headaches  - Interested in trying different medication     Past Medical History:   Diagnosis Date    Anxiety     Attention deficit disorder with hyperactivity 01/15/2018    Chronic bilateral low back pain without sciatica 03/22/2018    Colon polyp     Depression     Diverticulitis     Diverticulosis     Gastritis     GERD (gastroesophageal reflux disease) 04/12/2018     Review of Systems   HENT:  Negative for hearing loss.    Eyes:  Negative for discharge.   Respiratory:  Negative for wheezing.    Cardiovascular:  Negative for chest pain and palpitations.   Gastrointestinal:  Negative for blood in stool, constipation, diarrhea and vomiting.   Genitourinary:  Negative for dysuria and hematuria.   Musculoskeletal:  Negative for neck pain.   Neurological:  Negative for weakness and headaches.   Endo/Heme/Allergies:  Negative for polydipsia.     Answers submitted by the patient for this visit:  Review of Systems Questionnaire (Submitted on 7/22/2024)  activity change: No  unexpected weight change: No  rhinorrhea: No  trouble swallowing: No  visual disturbance: No  chest tightness: No  polyuria: No  difficulty urinating: No  menstrual problem: No  joint swelling: No  arthralgias:  No  confusion: No  dysphoric mood: No    Physical Exam:   Constitutional:       General: No acute distress.  HENT:      Head: Normocephalic and atraumatic.   Pulmonary:      Effort: Pulmonary effort is normal. No respiratory distress.   Neurological:      General: No focal deficit present.      Mental Status: Alert and oriented to person, place, and time. Mental status is at baseline.      Assessment and Plan:   See above    Betsy Goodrich MD  [facility]  Office: [phone]   Fax: [phone]  07/22/2024       Disclaimer: This note was partly generated using dictation software which may occasionally result in transcription errors.

## 2024-09-09 DIAGNOSIS — F90.9 ATTENTION DEFICIT DISORDER WITH HYPERACTIVITY: ICD-10-CM

## 2024-09-09 RX ORDER — LISDEXAMFETAMINE DIMESYLATE 30 MG/1
30 CAPSULE ORAL EVERY MORNING
Qty: 30 CAPSULE | Refills: 0 | Status: SHIPPED | OUTPATIENT
Start: 2024-09-09 | End: 2024-10-09

## 2024-09-09 NOTE — TELEPHONE ENCOUNTER
No care due was identified.  [facility] Embedded Care Due Messages. Reference number: [id number].   9/09/2024 4:05:48 PM CDT

## 2024-10-30 ENCOUNTER — OFFICE VISIT (OUTPATIENT)
Dept: GASTROENTEROLOGY | Facility: CLINIC | Age: 34
End: 2024-10-30
Payer: COMMERCIAL

## 2024-10-30 VITALS — HEIGHT: 64 IN | BODY MASS INDEX: 21.61 KG/M2 | WEIGHT: 126.56 LBS

## 2024-10-30 DIAGNOSIS — R10.84 GENERALIZED ABDOMINAL PAIN: Primary | ICD-10-CM

## 2024-10-30 DIAGNOSIS — Z98.890 HISTORY OF ABDOMINAL SURGERY: ICD-10-CM

## 2024-10-30 DIAGNOSIS — R19.7 INTERMITTENT DIARRHEA: ICD-10-CM

## 2024-10-30 DIAGNOSIS — K59.00 CONSTIPATION, UNSPECIFIED CONSTIPATION TYPE: ICD-10-CM

## 2024-10-30 PROCEDURE — 99214 OFFICE O/P EST MOD 30 MIN: CPT | Mod: S$GLB,,, | Performed by: NURSE PRACTITIONER

## 2024-10-30 PROCEDURE — 99999 PR PBB SHADOW E&M-EST. PATIENT-LVL III: CPT | Mod: PBBFAC,,, | Performed by: NURSE PRACTITIONER

## 2024-10-30 PROCEDURE — 1159F MED LIST DOCD IN RCRD: CPT | Mod: CPTII,S$GLB,, | Performed by: NURSE PRACTITIONER

## 2024-10-30 PROCEDURE — 1160F RVW MEDS BY RX/DR IN RCRD: CPT | Mod: CPTII,S$GLB,, | Performed by: NURSE PRACTITIONER

## 2024-10-30 PROCEDURE — 3008F BODY MASS INDEX DOCD: CPT | Mod: CPTII,S$GLB,, | Performed by: NURSE PRACTITIONER

## 2024-10-30 RX ORDER — POLYETHYLENE GLYCOL 3350 17 G/17G
17 POWDER, FOR SOLUTION ORAL DAILY
Qty: 510 G | Refills: 2 | Status: SHIPPED | OUTPATIENT
Start: 2024-10-30

## 2024-10-30 RX ORDER — DICYCLOMINE HYDROCHLORIDE 10 MG/1
10 CAPSULE ORAL
Qty: 120 CAPSULE | Refills: 0 | Status: SHIPPED | OUTPATIENT
Start: 2024-10-30 | End: 2025-10-30

## 2024-11-23 DIAGNOSIS — R10.84 GENERALIZED ABDOMINAL PAIN: ICD-10-CM

## 2024-11-26 RX ORDER — DICYCLOMINE HYDROCHLORIDE 10 MG/1
CAPSULE ORAL
Qty: 120 CAPSULE | Refills: 1 | Status: SHIPPED | OUTPATIENT
Start: 2024-11-26

## 2024-12-02 DIAGNOSIS — F90.9 ATTENTION DEFICIT DISORDER WITH HYPERACTIVITY: ICD-10-CM

## 2024-12-02 RX ORDER — LISDEXAMFETAMINE DIMESYLATE 30 MG/1
30 CAPSULE ORAL EVERY MORNING
Qty: 30 CAPSULE | Refills: 0 | Status: SHIPPED | OUTPATIENT
Start: 2024-12-02 | End: 2025-01-01

## 2024-12-02 NOTE — TELEPHONE ENCOUNTER
No care due was identified.  [facility] Embedded Care Due Messages. Reference number: [id number].   12/02/2024 10:05:12 AM CST

## 2024-12-06 ENCOUNTER — OFFICE VISIT (OUTPATIENT)
Dept: FAMILY MEDICINE | Facility: CLINIC | Age: 34
End: 2024-12-06
Payer: COMMERCIAL

## 2024-12-06 ENCOUNTER — LAB VISIT (OUTPATIENT)
Dept: LAB | Facility: HOSPITAL | Age: 34
End: 2024-12-06
Attending: STUDENT IN AN ORGANIZED HEALTH CARE EDUCATION/TRAINING PROGRAM
Payer: COMMERCIAL

## 2024-12-06 VITALS
HEART RATE: 82 BPM | HEIGHT: 64 IN | OXYGEN SATURATION: 99 % | SYSTOLIC BLOOD PRESSURE: 100 MMHG | BODY MASS INDEX: 21.76 KG/M2 | DIASTOLIC BLOOD PRESSURE: 60 MMHG | WEIGHT: 127.44 LBS

## 2024-12-06 DIAGNOSIS — Z02.0 SCHOOL PHYSICAL EXAM: ICD-10-CM

## 2024-12-06 DIAGNOSIS — F90.9 ATTENTION DEFICIT DISORDER WITH HYPERACTIVITY: ICD-10-CM

## 2024-12-06 DIAGNOSIS — Z02.0 SCHOOL PHYSICAL EXAM: Primary | ICD-10-CM

## 2024-12-06 PROCEDURE — 86480 TB TEST CELL IMMUN MEASURE: CPT | Performed by: STUDENT IN AN ORGANIZED HEALTH CARE EDUCATION/TRAINING PROGRAM

## 2024-12-06 PROCEDURE — 99999 PR PBB SHADOW E&M-EST. PATIENT-LVL III: CPT | Mod: PBBFAC,,, | Performed by: STUDENT IN AN ORGANIZED HEALTH CARE EDUCATION/TRAINING PROGRAM

## 2024-12-06 RX ORDER — LISDEXAMFETAMINE DIMESYLATE 30 MG/1
30 CAPSULE ORAL EVERY MORNING
Qty: 30 CAPSULE | Refills: 0 | Status: SHIPPED | OUTPATIENT
Start: 2025-02-04 | End: 2025-03-06

## 2024-12-06 RX ORDER — LISDEXAMFETAMINE DIMESYLATE 30 MG/1
30 CAPSULE ORAL EVERY MORNING
Qty: 30 CAPSULE | Refills: 0 | Status: SHIPPED | OUTPATIENT
Start: 2025-01-05 | End: 2025-02-04

## 2024-12-06 RX ORDER — LISDEXAMFETAMINE DIMESYLATE 30 MG/1
30 CAPSULE ORAL EVERY MORNING
Qty: 30 CAPSULE | Refills: 0 | Status: SHIPPED | OUTPATIENT
Start: 2024-12-06 | End: 2025-01-05

## 2024-12-06 NOTE — PROGRESS NOTES
Plan:      Linda was seen today for school physical and tb skin test.    Diagnoses and all orders for this visit:    School physical exam  -     influenza (Flulaval, Fluzone, Fluarix) 45 mcg/0.5 mL IM vaccine (> or = 6 mo) 0.5 mL  -     QUANTIFERON GOLD TB; Future    Attention deficit disorder with hyperactivity  -     lisdexamfetamine (VYVANSE) 30 MG capsule; Take 1 capsule (30 mg total) by mouth every morning.  -     lisdexamfetamine (VYVANSE) 30 MG capsule; Take 1 capsule (30 mg total) by mouth every morning.  -     lisdexamfetamine (VYVANSE) 30 MG capsule; Take 1 capsule (30 mg total) by mouth every morning.      Follow up in about 3 months (around 3/6/2025), or if symptoms worsen or fail to improve.    Betsy Goodrich MD  12/06/2024    Subjective:      Patient ID: Linda Sherwood is a 34 y.o. female    Chief Complaint   Patient presents with    school physical and tb skin test     Flu vacc     HPI  34 y.o. 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     Due for vyvanse refills.     Overall, doing well.    Review of Systems   HENT:  Negative for hearing loss.    Eyes:  Negative for discharge.   Respiratory:  Negative for wheezing.    Cardiovascular:  Negative for chest pain and palpitations.   Gastrointestinal:  Negative for blood in stool, constipation, diarrhea and vomiting.   Genitourinary:  Negative for dysuria and hematuria.   Musculoskeletal:  Negative for neck pain.   Neurological:  Negative for weakness and headaches.   Endo/Heme/Allergies:  Negative for polydipsia.     Answers submitted by the patient for this visit:  Review of Systems Questionnaire (Submitted on 12/3/2024)  activity change: No  unexpected weight change: No  rhinorrhea: No  trouble swallowing: No  visual disturbance: No  chest tightness: No  polyuria: No  difficulty urinating: No  menstrual  "problem: No  joint swelling: No  arthralgias: No  confusion: No  dysphoric mood: No    Current Outpatient Medications   Medication Instructions    dicyclomine (BENTYL) 10 MG capsule TAKE 1 CAPSULE BY MOUTH BEFORE MEALS AND AT BEDTIME AS NEEDED (ABDOMINAL CRAMPING/PAIN).    EScitalopram oxalate (LEXAPRO) 5 mg, Oral, Daily    lisdexamfetamine (VYVANSE) 30 mg, Oral, Every morning    lisdexamfetamine (VYVANSE) 30 mg, Oral, Every morning    [START ON 1/5/2025] lisdexamfetamine (VYVANSE) 30 mg, Oral, Every morning    [START ON 2/4/2025] lisdexamfetamine (VYVANSE) 30 mg, Oral, Every morning    polyethylene glycol (GLYCOLAX) 17 g, Oral, Daily, Mix with 8 oz of water/liquid.      Past Medical History:   Diagnosis Date    Anxiety     Attention deficit disorder with hyperactivity 01/15/2018    Chronic bilateral low back pain without sciatica 03/22/2018    Colon polyp     Depression     Diverticulitis     Diverticulosis     Gastritis     GERD (gastroesophageal reflux disease) 04/12/2018      Objective:      Vitals:    12/06/24 1502   BP: 100/60   Pulse: 82   SpO2: 99%   Weight: 57.8 kg (127 lb 6.8 oz)   Height: 5' 4" (1.626 m)     Body mass index is 21.87 kg/m².    Physical Exam   Constitutional:       General: No acute distress.  HENT:      Head: Normocephalic and atraumatic.   Pulmonary:      Effort: Pulmonary effort is normal. No respiratory distress.   Neurological:      General: No focal deficit present.      Mental Status: Alert and oriented to person, place, and time. Mental status is at baseline.    Assessment:       1. School physical exam    2. Attention deficit disorder with hyperactivity        Betsy Goodrich MD  [facility]  Office: [phone]   Fax: [phone]  12/06/2024      Disclaimer: This note was partly generated using dictation software which may occasionally result in transcription errors.    Total time spent on this encounter includes face to face time and non-face to " face time preparing to see the patient (eg, review of tests), obtaining and/or reviewing separately obtained history, documenting clinical information in the electronic or other health record, independently interpreting results, and communicating results to the patient/family/caregiver, or care coordinator.

## 2024-12-09 LAB
GAMMA INTERFERON BACKGROUND BLD IA-ACNC: 0 IU/ML
M TB IFN-G CD4+ BCKGRND COR BLD-ACNC: 0.01 IU/ML
M TB IFN-G CD4+ BCKGRND COR BLD-ACNC: 0.03 IU/ML
MITOGEN IGNF BCKGRD COR BLD-ACNC: 10 IU/ML
TB GOLD PLUS: NEGATIVE

## 2025-02-24 ENCOUNTER — PATIENT MESSAGE (OUTPATIENT)
Dept: FAMILY MEDICINE | Facility: CLINIC | Age: 35
End: 2025-02-24
Payer: COMMERCIAL

## 2025-02-24 DIAGNOSIS — Z00.00 PREVENTATIVE HEALTH CARE: Primary | ICD-10-CM

## 2025-02-25 ENCOUNTER — LAB VISIT (OUTPATIENT)
Dept: LAB | Facility: HOSPITAL | Age: 35
End: 2025-02-25
Attending: STUDENT IN AN ORGANIZED HEALTH CARE EDUCATION/TRAINING PROGRAM
Payer: COMMERCIAL

## 2025-02-25 DIAGNOSIS — Z00.00 PREVENTATIVE HEALTH CARE: ICD-10-CM

## 2025-02-25 LAB
25(OH)D3+25(OH)D2 SERPL-MCNC: 29 NG/ML (ref 30–96)
ALBUMIN SERPL BCP-MCNC: 3.8 G/DL (ref 3.5–5.2)
ALP SERPL-CCNC: 49 U/L (ref 40–150)
ALT SERPL W/O P-5'-P-CCNC: 14 U/L (ref 10–44)
ANION GAP SERPL CALC-SCNC: 8 MMOL/L (ref 8–16)
AST SERPL-CCNC: 25 U/L (ref 10–40)
BASOPHILS # BLD AUTO: 0.08 K/UL (ref 0–0.2)
BASOPHILS NFR BLD: 1.3 % (ref 0–1.9)
BILIRUB SERPL-MCNC: 0.2 MG/DL (ref 0.1–1)
BUN SERPL-MCNC: 13 MG/DL (ref 6–20)
CALCIUM SERPL-MCNC: 8.8 MG/DL (ref 8.7–10.5)
CHLORIDE SERPL-SCNC: 106 MMOL/L (ref 95–110)
CO2 SERPL-SCNC: 25 MMOL/L (ref 23–29)
CREAT SERPL-MCNC: 0.8 MG/DL (ref 0.5–1.4)
DIFFERENTIAL METHOD BLD: ABNORMAL
EOSINOPHIL # BLD AUTO: 0.3 K/UL (ref 0–0.5)
EOSINOPHIL NFR BLD: 4.2 % (ref 0–8)
ERYTHROCYTE [DISTWIDTH] IN BLOOD BY AUTOMATED COUNT: 13.6 % (ref 11.5–14.5)
EST. GFR  (NO RACE VARIABLE): >60 ML/MIN/1.73 M^2
GLUCOSE SERPL-MCNC: 97 MG/DL (ref 70–110)
HCT VFR BLD AUTO: 31.3 % (ref 37–48.5)
HGB BLD-MCNC: 10.1 G/DL (ref 12–16)
IMM GRANULOCYTES # BLD AUTO: 0.02 K/UL (ref 0–0.04)
IMM GRANULOCYTES NFR BLD AUTO: 0.3 % (ref 0–0.5)
LYMPHOCYTES # BLD AUTO: 1.7 K/UL (ref 1–4.8)
LYMPHOCYTES NFR BLD: 27.6 % (ref 18–48)
MCH RBC QN AUTO: 28.5 PG (ref 27–31)
MCHC RBC AUTO-ENTMCNC: 32.3 G/DL (ref 32–36)
MCV RBC AUTO: 88 FL (ref 82–98)
MONOCYTES # BLD AUTO: 0.4 K/UL (ref 0.3–1)
MONOCYTES NFR BLD: 5.8 % (ref 4–15)
NEUTROPHILS # BLD AUTO: 3.7 K/UL (ref 1.8–7.7)
NEUTROPHILS NFR BLD: 60.8 % (ref 38–73)
NRBC BLD-RTO: 0 /100 WBC
PLATELET # BLD AUTO: 300 K/UL (ref 150–450)
PMV BLD AUTO: 10.6 FL (ref 9.2–12.9)
POTASSIUM SERPL-SCNC: 4.8 MMOL/L (ref 3.5–5.1)
PROT SERPL-MCNC: 6.8 G/DL (ref 6–8.4)
RBC # BLD AUTO: 3.54 M/UL (ref 4–5.4)
SODIUM SERPL-SCNC: 139 MMOL/L (ref 136–145)
TSH SERPL DL<=0.005 MIU/L-ACNC: 1.96 UIU/ML (ref 0.4–4)
WBC # BLD AUTO: 6.01 K/UL (ref 3.9–12.7)

## 2025-02-25 PROCEDURE — 85025 COMPLETE CBC W/AUTO DIFF WBC: CPT | Performed by: STUDENT IN AN ORGANIZED HEALTH CARE EDUCATION/TRAINING PROGRAM

## 2025-02-25 PROCEDURE — 36415 COLL VENOUS BLD VENIPUNCTURE: CPT | Mod: PN | Performed by: STUDENT IN AN ORGANIZED HEALTH CARE EDUCATION/TRAINING PROGRAM

## 2025-02-25 PROCEDURE — 82306 VITAMIN D 25 HYDROXY: CPT | Performed by: STUDENT IN AN ORGANIZED HEALTH CARE EDUCATION/TRAINING PROGRAM

## 2025-02-25 PROCEDURE — 80053 COMPREHEN METABOLIC PANEL: CPT | Performed by: STUDENT IN AN ORGANIZED HEALTH CARE EDUCATION/TRAINING PROGRAM

## 2025-02-25 PROCEDURE — 84443 ASSAY THYROID STIM HORMONE: CPT | Performed by: STUDENT IN AN ORGANIZED HEALTH CARE EDUCATION/TRAINING PROGRAM

## 2025-02-26 ENCOUNTER — RESULTS FOLLOW-UP (OUTPATIENT)
Dept: FAMILY MEDICINE | Facility: CLINIC | Age: 35
End: 2025-02-26
Payer: COMMERCIAL

## 2025-02-27 NOTE — PROGRESS NOTES
Assessment and Plan:    Diagnoses and all orders for this visit:    Contact dermatitis due to poison ivy  -     methylPREDNISolone (MEDROL DOSEPACK) 4 mg tablet; use as directed    Angular cheilitis due to bacterial infection  -     sulfamethoxazole-trimethoprim 800-160mg (BACTRIM DS) 800-160 mg Tab; Take 1 tablet by mouth 2 (two) times daily. for 7 days  -     mupirocin (BACTROBAN) 2 % ointment; Apply topically 3 (three) times daily. for 7 days      Follow up if symptoms worsen or fail to improve.  Advised patient to follow up early next week if symptoms have not completely resolved.  Additionally, advised patient to go to the nearest emergency room for further evaluation and treatment if symptoms worsen or any new, concerning symptoms develop.  Patient agreeable to plan, expressed understanding, all questions answered.    Betsy Goodrich MD  06/06/2024     Audiovisual Telehealth Visit:     The patient location is: Home  The chief complaint leading to consultation is: (documented below in HPI)  Visit type: Virtual visit with audiovisual  Total time spent on this encounter: 20 minutes.This includes face to face time and non-face to face time preparing to see the patient (eg, review of tests), obtaining and/or reviewing separately obtained history, documenting clinical information in the electronic or other health record, independently interpreting results, and communicating results to the patient/family/caregiver, or care coordinator.       Each patient to whom I provide medical services by telemedicine is: (1) informed of the relationship between the physician and patient and the respective role of any other health care provider with respect to management of the patient; and (2) notified that they may decline to receive medical services by telemedicine and may withdraw from such care at any time. Patient verbally consented to receive this service via audiovisual call.    Patient ID: Linda Sherwood is a 34 y.o.  Detail Level: Zone female     HPI: 34 y.o. female with a PMHx as documented below presents to clinic today (via audiovisual telehealth visit) for the following:    Reports one-day history of lip swelling, dryness, and pain occurring in the setting of poison ivy exposure.  Patient states that the corners of her mouth are starting to crack and thus bothering her as well.  No associated tongue swelling, throat swelling, difficulty breathing, facial swelling otherwise.    Past Medical History:   Diagnosis Date    Anxiety     Attention deficit disorder with hyperactivity 01/15/2018    Chronic bilateral low back pain without sciatica 03/22/2018    Colon polyp     Depression     Diverticulitis     Diverticulosis     Gastritis     GERD (gastroesophageal reflux disease) 04/12/2018     Review of Systems   Constitutional:  Negative for fever.   HENT:  Negative for congestion and sore throat.    Eyes:  Negative for pain.   Respiratory:  Negative for cough and shortness of breath.    Gastrointestinal:  Negative for diarrhea and vomiting.   Musculoskeletal:  Negative for joint pain.   Skin:  Positive for rash.     Answers submitted by the patient for this visit:  Rash Questionnaire (Submitted on 6/6/2024)  Chief Complaint: Rash  Chronicity: recurrent  Onset: in the past 7 days  Progression since onset: gradually worsening  Affected locations: face, lips  Characteristics: blistering, burning, dryness, pain, redness, swelling, itchiness, scaling  Exposed to: plant contact  anorexia: No  facial edema: No  fatigue: No  nail changes: No  rhinorrhea: No  Treatments tried: nothing  Improvement on treatment: no relief  asthma: No  allergies: No  eczema: No  varicella: Yes    Physical Exam:  Constitutional:       General: No acute distress.  HENT:      Head: Normocephalic and atraumatic.   Pulmonary:      Effort: Pulmonary effort is normal. No respiratory distress.   Neurological:      General: No focal deficit present.      Mental Status: Alert and  Render In Strict Bullet Format?: No oriented to person, place, and time. Mental status is at baseline.     Assessment and Plan:   See above    Betsy Goodrich MD  [facility]  Office: [phone]   Fax: [phone]  06/06/2024       Disclaimer: This note was partly generated using dictation software which may occasionally result in transcription errors.   Continue Regimen: clindamycin 1 % topical gel \\nQuantity: 60.0 g  Days Supply: 30\\nSig: Apply to affected area on back once daily x 4 times per week\\n\\ntriamcinolone acetonide 0.1 % topical cream \\nQuantity: 80.0 g  Days Supply: 30\\nSig: Apply to affected areas on back once daily for 2 weeks on/off, as needed for itch and irritation.\\n\\nMoisturize skin on back daily with vaseline. Initiate Treatment: Applied Duoderm patch to abrasions and can leave on for 5 days, then change.

## 2025-03-19 ENCOUNTER — PATIENT MESSAGE (OUTPATIENT)
Dept: FAMILY MEDICINE | Facility: CLINIC | Age: 35
End: 2025-03-19
Payer: COMMERCIAL

## 2025-03-19 DIAGNOSIS — Z00.00 PREVENTATIVE HEALTH CARE: Primary | ICD-10-CM

## 2025-03-20 NOTE — TELEPHONE ENCOUNTER
Please review request and advise if you would like to order, or if she should have the fertility clinic order this

## 2025-03-21 ENCOUNTER — LAB VISIT (OUTPATIENT)
Dept: LAB | Facility: HOSPITAL | Age: 35
End: 2025-03-21
Payer: COMMERCIAL

## 2025-03-21 DIAGNOSIS — Z00.00 PREVENTATIVE HEALTH CARE: ICD-10-CM

## 2025-03-21 LAB
BASOPHILS # BLD AUTO: 0.12 K/UL (ref 0–0.2)
BASOPHILS NFR BLD: 1.7 % (ref 0–1.9)
DIFFERENTIAL METHOD BLD: ABNORMAL
EOSINOPHIL # BLD AUTO: 0.2 K/UL (ref 0–0.5)
EOSINOPHIL NFR BLD: 3.4 % (ref 0–8)
ERYTHROCYTE [DISTWIDTH] IN BLOOD BY AUTOMATED COUNT: 14 % (ref 11.5–14.5)
ESTIMATED AVG GLUCOSE: 88 MG/DL (ref 68–131)
HBA1C MFR BLD: 4.7 % (ref 4–5.6)
HCT VFR BLD AUTO: 36.1 % (ref 37–48.5)
HGB BLD-MCNC: 11.6 G/DL (ref 12–16)
IMM GRANULOCYTES # BLD AUTO: 0.03 K/UL (ref 0–0.04)
IMM GRANULOCYTES NFR BLD AUTO: 0.4 % (ref 0–0.5)
LYMPHOCYTES # BLD AUTO: 2.1 K/UL (ref 1–4.8)
LYMPHOCYTES NFR BLD: 30 % (ref 18–48)
MCH RBC QN AUTO: 28.5 PG (ref 27–31)
MCHC RBC AUTO-ENTMCNC: 32.1 G/DL (ref 32–36)
MCV RBC AUTO: 89 FL (ref 82–98)
MONOCYTES # BLD AUTO: 0.5 K/UL (ref 0.3–1)
MONOCYTES NFR BLD: 6.8 % (ref 4–15)
NEUTROPHILS # BLD AUTO: 4.1 K/UL (ref 1.8–7.7)
NEUTROPHILS NFR BLD: 57.7 % (ref 38–73)
NRBC BLD-RTO: 0 /100 WBC
PLATELET # BLD AUTO: 306 K/UL (ref 150–450)
PMV BLD AUTO: 11.4 FL (ref 9.2–12.9)
RBC # BLD AUTO: 4.07 M/UL (ref 4–5.4)
WBC # BLD AUTO: 7.11 K/UL (ref 3.9–12.7)

## 2025-03-21 PROCEDURE — 83036 HEMOGLOBIN GLYCOSYLATED A1C: CPT | Performed by: STUDENT IN AN ORGANIZED HEALTH CARE EDUCATION/TRAINING PROGRAM

## 2025-03-21 PROCEDURE — 85025 COMPLETE CBC W/AUTO DIFF WBC: CPT | Performed by: STUDENT IN AN ORGANIZED HEALTH CARE EDUCATION/TRAINING PROGRAM

## 2025-03-24 ENCOUNTER — RESULTS FOLLOW-UP (OUTPATIENT)
Dept: FAMILY MEDICINE | Facility: CLINIC | Age: 35
End: 2025-03-24

## 2025-07-11 ENCOUNTER — OFFICE VISIT (OUTPATIENT)
Dept: FAMILY MEDICINE | Facility: CLINIC | Age: 35
End: 2025-07-11
Payer: COMMERCIAL

## 2025-07-11 VITALS
OXYGEN SATURATION: 100 % | DIASTOLIC BLOOD PRESSURE: 70 MMHG | TEMPERATURE: 98 F | HEART RATE: 85 BPM | RESPIRATION RATE: 18 BRPM | WEIGHT: 133.38 LBS | SYSTOLIC BLOOD PRESSURE: 100 MMHG | HEIGHT: 64 IN | BODY MASS INDEX: 22.77 KG/M2

## 2025-07-11 DIAGNOSIS — L23.7 POISON IVY DERMATITIS: Primary | ICD-10-CM

## 2025-07-11 PROCEDURE — 1160F RVW MEDS BY RX/DR IN RCRD: CPT | Mod: CPTII,S$GLB,, | Performed by: NURSE PRACTITIONER

## 2025-07-11 PROCEDURE — 3078F DIAST BP <80 MM HG: CPT | Mod: CPTII,S$GLB,, | Performed by: NURSE PRACTITIONER

## 2025-07-11 PROCEDURE — 3074F SYST BP LT 130 MM HG: CPT | Mod: CPTII,S$GLB,, | Performed by: NURSE PRACTITIONER

## 2025-07-11 PROCEDURE — 3008F BODY MASS INDEX DOCD: CPT | Mod: CPTII,S$GLB,, | Performed by: NURSE PRACTITIONER

## 2025-07-11 PROCEDURE — 3044F HG A1C LEVEL LT 7.0%: CPT | Mod: CPTII,S$GLB,, | Performed by: NURSE PRACTITIONER

## 2025-07-11 PROCEDURE — 96372 THER/PROPH/DIAG INJ SC/IM: CPT | Mod: S$GLB,,, | Performed by: NURSE PRACTITIONER

## 2025-07-11 PROCEDURE — 1159F MED LIST DOCD IN RCRD: CPT | Mod: CPTII,S$GLB,, | Performed by: NURSE PRACTITIONER

## 2025-07-11 PROCEDURE — 99213 OFFICE O/P EST LOW 20 MIN: CPT | Mod: 25,S$GLB,, | Performed by: NURSE PRACTITIONER

## 2025-07-11 RX ORDER — NORETHINDRONE 5 MG/1
5 TABLET ORAL
COMMUNITY
Start: 2025-07-08

## 2025-07-11 RX ORDER — METHYLPREDNISOLONE 4 MG/1
TABLET ORAL
Qty: 21 EACH | Refills: 0 | Status: SHIPPED | OUTPATIENT
Start: 2025-07-11 | End: 2025-08-01

## 2025-07-11 RX ORDER — DEXAMETHASONE SODIUM PHOSPHATE 4 MG/ML
8 INJECTION, SOLUTION INTRA-ARTICULAR; INTRALESIONAL; INTRAMUSCULAR; INTRAVENOUS; SOFT TISSUE
Status: COMPLETED | OUTPATIENT
Start: 2025-07-11 | End: 2025-07-11

## 2025-07-11 RX ADMIN — DEXAMETHASONE SODIUM PHOSPHATE 8 MG: 4 INJECTION, SOLUTION INTRA-ARTICULAR; INTRALESIONAL; INTRAMUSCULAR; INTRAVENOUS; SOFT TISSUE at 02:07

## 2025-07-11 NOTE — PROGRESS NOTES
Subjective:       Patient ID: Linda Sherwood is a 35 y.o. female.    Chief Complaint: Poison Nadira    Poison Ivy  Pertinent negatives include no cough, diarrhea, fatigue, fever, shortness of breath or vomiting.     Here with concerns for poison ivy rash to lips. Onset Tuesday.  Thinks she pick it up from her dog. Applying topical steroid cream, calamine lotion, ice. States she is very sensitive to poison ivy. She denies any other areas of rash. She denies SOB, wheezing, etc.  No other concerns. See ROS    The following portion of the patients history was reviewed and updated as appropriate: allergies, current medications, past medical and surgical history. Past social history and problem list reviewed. Family PMH and Past social history reviewed. Tobacco, Illicit drug use reviewed.      Review of patient's allergies indicates:   Allergen Reactions    Poison ivy extract        Current Medications[1]    Past Medical History:   Diagnosis Date    Anxiety     Attention deficit disorder with hyperactivity 01/15/2018    Chronic bilateral low back pain without sciatica 03/22/2018    Colon polyp     Depression     Diverticulitis     Diverticulosis     Gastritis     GERD (gastroesophageal reflux disease) 04/12/2018       Past Surgical History:   Procedure Laterality Date    APPENDECTOMY      AUGMENTATION OF BREAST  2009    BREAST SURGERY  2009    BRONCHOSCOPY Bilateral 9/22/2023    Procedure: Bronchoscopy;  Surgeon: Duncan Kapoor Jr., DO;  Location: [facility];  Service: Pulmonary;  Laterality: Bilateral;    COLONOSCOPY  09/28/2021    in media/procedures, Dr. Garay, biopsy report received and sent for scanning; repeat in 5 years for surveillance    DIAGNOSTIC LAPAROSCOPY N/A 11/29/2022    Procedure: LAPAROSCOPY, DIAGNOSTIC;  Surgeon: Jamir Sher Jr., MD;  Location: [facility] OR;  Service: General;  Laterality: N/A;    EGD - EXTERNAL RESULT  09/28/2021    in media/procedures, Dr. 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   HENT: Negative.     Respiratory:  Negative for cough, chest tightness, shortness of breath and wheezing.    Cardiovascular:  Negative for chest pain, palpitations and leg swelling.   Gastrointestinal:  Negative for abdominal pain, diarrhea, nausea and vomiting.   Genitourinary: Negative.    Musculoskeletal:  Negative for arthralgias, back pain and gait problem.   Skin:  Positive for rash (itchy rash around lips).   Neurological:  Negative for headaches.       Objective:      /70 (BP Location: Left arm, Patient Position: Sitting)   Pulse 85   Temp 98.1 °F (36.7 °C) (Temporal)   Resp 18   Ht 5' 4" (1.626 m)   Wt 60.5 kg (133 lb 6.1 oz)   LMP 07/01/2025   SpO2 100%   BMI 22.89 kg/m²      Physical Exam  Constitutional:       Appearance: Normal appearance. She is obese.   HENT:      Head: Normocephalic.      Mouth/Throat:      Lips: Lesions (crusty poison ivy type rash around outer lips. no lesions in the mouth or inner lips) present.   Eyes:      Pupils: Pupils are equal, round, and reactive to light.   Cardiovascular:      Rate and Rhythm: Normal rate and regular rhythm.      Pulses: Normal pulses.      Heart sounds: Normal heart sounds. No murmur heard.  Pulmonary:      Effort: Pulmonary effort is normal.      Breath sounds: Normal breath sounds. No wheezing.   Musculoskeletal:         General: Normal range of motion.      Cervical back: Normal range of " motion.      Comments: Gait normal.    Skin:     General: Skin is warm and dry.      Findings: Rash (face, around lips.) present.   Neurological:      General: No focal deficit present.      Mental Status: She is alert.   Psychiatric:         Attention and Perception: Attention and perception normal.         Mood and Affect: Mood and affect normal.         Speech: Speech normal.         Behavior: Behavior normal.         Assessment:       1. Poison ivy dermatitis        Plan:       Poison ivy dermatitis: she would like steroid injection. States this usually clears it up for her  -     dexAMETHasone injection 8 mg:  Risks and benefits discussed. Potential side effects such as anxiety, palpitations and flushing discussed. May increase blood glucose levels over the next 48 hours. Potential for skin atrophy at injection site. Tolerated injection well.    Other orders: if symptoms not clearing over the next 48 hours start the steroid dose pack. Take as directed.   -     methylPREDNISolone (MEDROL DOSEPACK) 4 mg tablet; use as directed  Dispense: 21 each; Refill: 0       Take medications only as prescribed  Healthy diet  Adequate rest  Adequate hydration  Avoid allergens  Avoid excessive caffeine     Follow up if not improving         [1]   Current Outpatient Medications:     norethindrone (AYGESTIN) 5 mg Tab, Take 5 mg by mouth., Disp: , Rfl:     dicyclomine (BENTYL) 10 MG capsule, TAKE 1 CAPSULE BY MOUTH BEFORE MEALS AND AT BEDTIME AS NEEDED (ABDOMINAL CRAMPING/PAIN). (Patient not taking: Reported on 7/11/2025), Disp: 120 capsule, Rfl: 1    EScitalopram oxalate (LEXAPRO) 5 MG Tab, Take 1 tablet (5 mg total) by mouth once daily. (Patient not taking: Reported on 7/11/2025), Disp: 90 tablet, Rfl: 3    lisdexamfetamine (VYVANSE) 30 MG capsule, Take 1 capsule (30 mg total) by mouth every morning. (Patient not taking: Reported on 7/11/2025), Disp: 30 capsule, Rfl: 0    polyethylene glycol (GLYCOLAX) 17 gram/dose powder, Take  17 g by mouth once daily. Mix with 8 oz of water/liquid. (Patient not taking: Reported on 7/11/2025), Disp: 510 g, Rfl: 2  [2]   Social History  Socioeconomic History    Marital status:    Occupational History     Employer: [employer]   Tobacco Use    Smoking status: Former    Smokeless tobacco: Never   Substance and Sexual Activity    Alcohol use: Yes     Alcohol/week: 1.0 standard drink of alcohol     Types: 1 Cans of beer per week     Comment: ocasionally; 3x monthly    Drug use: No    Sexual activity: Yes     Partners: Male     Birth control/protection: Partner-Vasectomy, None     Social Drivers of Health     Financial Resource Strain: Low Risk  (7/11/2025)    Overall Financial Resource Strain (CARDIA)     Difficulty of Paying Living Expenses: Not hard at all   Food Insecurity: No Food Insecurity (7/11/2025)    Hunger Vital Sign     Worried About Running Out of Food in the Last Year: Never true     Ran Out of Food in the Last Year: Never true   Transportation Needs: No Transportation Needs (7/11/2025)    PRAPARE - Transportation     Lack of Transportation (Medical): No     Lack of Transportation (Non-Medical): No   Physical Activity: Insufficiently Active (7/11/2025)    Exercise Vital Sign     Days of Exercise per Week: 3 days     Minutes of Exercise per Session: 30 min   Stress: No Stress Concern Present (7/11/2025)    [employer] [place] [employer] - Occupational Stress Questionnaire     Feeling of Stress : Only a little   Housing Stability: Low Risk  (7/11/2025)    Housing Stability Vital Sign     Unable to Pay for Housing in the Last Year: No     Number of Times Moved in the Last Year: 0     Homeless in the Last Year: No

## (undated) DEVICE — LINER SUCTION 3000CC

## (undated) DEVICE — SCISSOR 5MMX35CM DIRECT DRIVE

## (undated) DEVICE — GLOVE SURG ULTRA TOUCH 7

## (undated) DEVICE — DISSECTOR 5MM ENDOPATH

## (undated) DEVICE — TROCAR KII FIOS 5MM X 100MM

## (undated) DEVICE — SOL CLEARIFY VISUALIZATION LAP

## (undated) DEVICE — PACK CUSTOM ENDO CHOLO SLI

## (undated) DEVICE — SYR 30CC LUER LOCK

## (undated) DEVICE — GOWN POLY REINF BRTH SLV LG

## (undated) DEVICE — GOWN POLY REINF BRTH SLV XL

## (undated) DEVICE — KIT PROCEDURE STER INLET CLOSU

## (undated) DEVICE — GLOVE BIOGEL PI MICRO SZ 7

## (undated) DEVICE — STRAP OR TABLE 5IN X 72IN

## (undated) DEVICE — CLOSURE SKIN STERI STRIP 1/2X4

## (undated) DEVICE — SUT MONOCRYL 4-0 SH UND MON

## (undated) DEVICE — SLEEVE SCD EXPRESS KNEE MEDIUM

## (undated) DEVICE — SUT 0 VICRYL / UR6 (J603)

## (undated) DEVICE — BLADE SURG CARBON STEEL SZ11

## (undated) DEVICE — NDL SPINAL 18GX3.5 SPINOCAN

## (undated) DEVICE — SET TUBE PNEUMOCLEAR SE HI FLO

## (undated) DEVICE — SEALER LIGASURE MARYLAND 37CM

## (undated) DEVICE — SOL 9P NACL IRR PIC IL

## (undated) DEVICE — GLOVE SURG ULTRA TOUCH 7.5

## (undated) DEVICE — ADHESIVE DERMABOND ADVANCED

## (undated) DEVICE — TOWEL OR DISP STRL BLUE 4/PK

## (undated) DEVICE — NDL INSUF ULTRA VERESS 120MM

## (undated) DEVICE — APPLICATOR CHLORAPREP ORN 26ML

## (undated) DEVICE — ELECTRODE REM PLYHSV RETURN 9

## (undated) DEVICE — Device